# Patient Record
Sex: FEMALE | Race: WHITE | Employment: UNEMPLOYED | URBAN - METROPOLITAN AREA
[De-identification: names, ages, dates, MRNs, and addresses within clinical notes are randomized per-mention and may not be internally consistent; named-entity substitution may affect disease eponyms.]

---

## 2017-04-19 PROBLEM — R42 LIGHTHEADED: Status: ACTIVE | Noted: 2017-01-01

## 2017-04-19 PROBLEM — R42 DIZZINESS: Status: ACTIVE | Noted: 2017-01-01

## 2017-05-17 PROBLEM — M79.7 FIBROMYALGIA: Status: ACTIVE | Noted: 2017-05-17

## 2017-05-17 PROBLEM — F32.A DEPRESSION: Status: ACTIVE | Noted: 2017-05-17

## 2017-05-17 PROBLEM — G45.0 VERTEBROBASILAR TIAS: Status: ACTIVE | Noted: 2017-05-17

## 2017-11-17 PROBLEM — M79.89 LEG SWELLING: Status: ACTIVE | Noted: 2017-11-17

## 2020-11-03 PROBLEM — R42 DIZZINESS: Status: RESOLVED | Noted: 2017-01-01 | Resolved: 2020-11-03

## 2021-03-06 ENCOUNTER — HOSPITAL ENCOUNTER (EMERGENCY)
Age: 41
Discharge: HOME | End: 2021-03-06
Payer: COMMERCIAL

## 2021-03-06 VITALS
WEIGHT: 140 LBS | HEART RATE: 85 BPM | RESPIRATION RATE: 16 BRPM | SYSTOLIC BLOOD PRESSURE: 129 MMHG | HEIGHT: 63 IN | DIASTOLIC BLOOD PRESSURE: 96 MMHG | BODY MASS INDEX: 24.8 KG/M2 | OXYGEN SATURATION: 100 % | TEMPERATURE: 97.1 F

## 2021-03-06 DIAGNOSIS — S05.01XA ABRASION OF RIGHT CORNEA, INITIAL ENCOUNTER: Primary | ICD-10-CM

## 2021-03-06 PROCEDURE — 6370000000 HC RX 637 (ALT 250 FOR IP): Performed by: PHYSICIAN ASSISTANT

## 2021-03-06 PROCEDURE — 6360000002 HC RX W HCPCS: Performed by: PHYSICIAN ASSISTANT

## 2021-03-06 PROCEDURE — 90471 IMMUNIZATION ADMIN: CPT | Performed by: PHYSICIAN ASSISTANT

## 2021-03-06 PROCEDURE — 90715 TDAP VACCINE 7 YRS/> IM: CPT | Performed by: PHYSICIAN ASSISTANT

## 2021-03-06 PROCEDURE — 99284 EMERGENCY DEPT VISIT MOD MDM: CPT

## 2021-03-06 RX ORDER — TETRACAINE HYDROCHLORIDE 5 MG/ML
2 SOLUTION OPHTHALMIC ONCE
Status: COMPLETED | OUTPATIENT
Start: 2021-03-06 | End: 2021-03-06

## 2021-03-06 RX ORDER — KETOROLAC TROMETHAMINE 5 MG/ML
1 SOLUTION OPHTHALMIC 4 TIMES DAILY
Qty: 10 ML | Refills: 1 | Status: SHIPPED | OUTPATIENT
Start: 2021-03-06 | End: 2021-03-13

## 2021-03-06 RX ORDER — ERYTHROMYCIN 5 MG/G
OINTMENT OPHTHALMIC
Qty: 1 TUBE | Refills: 0 | Status: ON HOLD | OUTPATIENT
Start: 2021-03-06 | End: 2021-11-14

## 2021-03-06 RX ADMIN — TETRACAINE HYDROCHLORIDE 2 DROP: 5 SOLUTION/ DROPS OPHTHALMIC at 15:00

## 2021-03-06 RX ADMIN — TETANUS TOXOID, REDUCED DIPHTHERIA TOXOID AND ACELLULAR PERTUSSIS VACCINE, ADSORBED 0.5 ML: 5; 2.5; 8; 8; 2.5 SUSPENSION INTRAMUSCULAR at 15:13

## 2021-03-06 RX ADMIN — FLUORESCEIN SODIUM 1 EACH: 0.6 STRIP OPHTHALMIC at 15:00

## 2021-03-06 ASSESSMENT — PAIN DESCRIPTION - FREQUENCY: FREQUENCY: CONTINUOUS

## 2021-03-06 ASSESSMENT — PAIN DESCRIPTION - DESCRIPTORS: DESCRIPTORS: BURNING

## 2021-03-06 ASSESSMENT — PAIN DESCRIPTION - LOCATION: LOCATION: EYE

## 2021-03-06 ASSESSMENT — PAIN SCALES - GENERAL: PAINLEVEL_OUTOF10: 2
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
## 2021-03-06 NOTE — ED NOTES
Visual acuity  Pt does not wear corrective lenses  Right 20/20  Left 2015  Both 209 Eugenia Burger RN  03/06/21 2306

## 2021-06-22 ENCOUNTER — ROUTINE PRENATAL (OUTPATIENT)
Dept: OBGYN CLINIC | Age: 41
End: 2021-06-22
Payer: COMMERCIAL

## 2021-06-22 ENCOUNTER — ANCILLARY PROCEDURE (OUTPATIENT)
Dept: OBGYN CLINIC | Age: 41
End: 2021-06-22
Payer: COMMERCIAL

## 2021-06-22 VITALS
HEIGHT: 63 IN | WEIGHT: 140 LBS | BODY MASS INDEX: 24.8 KG/M2 | HEART RATE: 89 BPM | DIASTOLIC BLOOD PRESSURE: 67 MMHG | SYSTOLIC BLOOD PRESSURE: 128 MMHG

## 2021-06-22 DIAGNOSIS — D25.9 UTERINE FIBROIDS AFFECTING PREGNANCY, SECOND TRIMESTER: ICD-10-CM

## 2021-06-22 DIAGNOSIS — E03.9 HYPOTHYROID IN PREGNANCY, ANTEPARTUM, SECOND TRIMESTER: ICD-10-CM

## 2021-06-22 DIAGNOSIS — O34.12 UTERINE FIBROIDS AFFECTING PREGNANCY, SECOND TRIMESTER: ICD-10-CM

## 2021-06-22 DIAGNOSIS — O09.522 ELDERLY MULTIGRAVIDA, SECOND TRIMESTER: Primary | ICD-10-CM

## 2021-06-22 DIAGNOSIS — Z3A.14 14 WEEKS GESTATION OF PREGNANCY: ICD-10-CM

## 2021-06-22 DIAGNOSIS — O35.5XX0 SUSPECTED DAMAGE TO FETUS FROM DRUGS, AFFECTING MANAGEMENT OF MOTHER, SINGLE OR UNSPECIFIED FETUS: ICD-10-CM

## 2021-06-22 DIAGNOSIS — Z03.75 SUSPECTED SHORTENING OF CERVIX NOT FOUND: ICD-10-CM

## 2021-06-22 DIAGNOSIS — O99.282 HYPOTHYROID IN PREGNANCY, ANTEPARTUM, SECOND TRIMESTER: ICD-10-CM

## 2021-06-22 DIAGNOSIS — O24.410 DIET CONTROLLED GESTATIONAL DIABETES MELLITUS (GDM) IN SECOND TRIMESTER: ICD-10-CM

## 2021-06-22 DIAGNOSIS — O34.42 HISTORY OF CERVICAL LEEP BIOPSY AFFECTING CARE OF MOTHER, ANTEPARTUM, SECOND TRIMESTER: ICD-10-CM

## 2021-06-22 LAB
GLUCOSE URINE, POC: NEGATIVE
PROTEIN UA: NEGATIVE

## 2021-06-22 PROCEDURE — 76815 OB US LIMITED FETUS(S): CPT | Performed by: OBSTETRICS & GYNECOLOGY

## 2021-06-22 PROCEDURE — 81002 URINALYSIS NONAUTO W/O SCOPE: CPT | Performed by: OBSTETRICS & GYNECOLOGY

## 2021-06-22 PROCEDURE — 99203 OFFICE O/P NEW LOW 30 MIN: CPT | Performed by: OBSTETRICS & GYNECOLOGY

## 2021-06-22 PROCEDURE — G8427 DOCREV CUR MEDS BY ELIG CLIN: HCPCS | Performed by: OBSTETRICS & GYNECOLOGY

## 2021-06-22 PROCEDURE — G8420 CALC BMI NORM PARAMETERS: HCPCS | Performed by: OBSTETRICS & GYNECOLOGY

## 2021-06-22 PROCEDURE — 76817 TRANSVAGINAL US OBSTETRIC: CPT | Performed by: OBSTETRICS & GYNECOLOGY

## 2021-06-22 PROCEDURE — 99243 OFF/OP CNSLTJ NEW/EST LOW 30: CPT | Performed by: OBSTETRICS & GYNECOLOGY

## 2021-06-22 RX ORDER — MULTIVIT-MIN/IRON/FOLIC ACID/K 18-600-40
CAPSULE ORAL
Status: ON HOLD | COMMUNITY
End: 2021-11-14

## 2021-06-22 RX ORDER — LORATADINE 10 MG/1
10 CAPSULE, LIQUID FILLED ORAL DAILY
Status: ON HOLD | COMMUNITY
End: 2021-11-14

## 2021-06-22 RX ORDER — ASPIRIN 81 MG/1
81 TABLET, CHEWABLE ORAL DAILY
Qty: 30 TABLET | Refills: 3 | Status: SHIPPED | OUTPATIENT
Start: 2021-06-22

## 2021-06-22 NOTE — PROGRESS NOTES
21    RE:  Magdy Argueta   : 1980   AGE: 36 y.o. REFERRING PHYSICIAN:              Zbigniew Greene CNM      Thank you for referring Magdy Argueta a 36 y.o. [place] who is seen today in our office. REASON FOR CONSULTATION:  · Consultation and comanagement of pregnant patient with advanced maternal age and gestational diabetes. Mrs Magdy Argueta gave the following history when I saw her today:    OB History    Para Term  AB Living   2 1 1 0 0 1   SAB TAB Ectopic Molar Multiple Live Births   0 0 0 0 0 1      # Outcome Date GA Lbr Eliel/2nd Weight Sex Delivery Anes PTL Lv   2 Current            1 Term 03 41w0d  8 lb 13 oz (3.997 kg) M Vag-Spont EPI N JAXSON     PAST GYNECOLOGICAL  HISTORY:  Positive for:  · HPV, and abnormal pap smears requiring surgical treatment with a  LEEP in . Negative for other sexually transmitted diseases. PAST MEDICAL HISTORY:  Past Medical History:   Diagnosis Date    Abnormal Pap smear of cervix     Chronic back pain     Dazed state 2017    Dizziness 2017    Endometriosis     Fibromyalgia     Headache     Hyperthyroidism 2017    Impaired cognition 2017    Lightheaded 2017    Migraine 2017    1 to 2 per month, 7/10 on the pain scale    Neck pain 2017    Painful sensitiveness to sound 2017    Shoulder pain, bilateral 2017    Negative for Hypertension, Asthma or Heart disease. PAST SURGICAL HISTORY:  Past Surgical History:   Procedure Laterality Date    LAPAROSCOPY      LEEP Bilateral     TONSILLECTOMY Bilateral    Negative for Appendectomy, or Cholecystectomy. ALLERGIES:    Allergies   Allergen Reactions    Biaxin [Clarithromycin]     Sulfa Antibiotics        MEDICATIONS:    · Prenatal Vitamins once per day   · Synthroid 100 mcg orally once per day. · Vitamin B6.  · Unisom. · Vitamin D3 2000 units/day  · Claritin 10 mg orally as needed. · Nasonex as needed.     SOCIAL  HISTORY: Denies smoking, Alcohol and Drug abuse. REVIEW OF SYSTEMS:    CONSTITUTIONAL : No fever, no chills   HEENT :  No headache, no visual changes, no rhinorrhea, no sore throat   CARDIOVASCULAR :  No pain, no palpitations, no edema   RESPIRATORY :  No pain, no shortness of breath   GASTROINTESTINAL : No N/V, no D/C, no abdominal pain   GENITOURINARY :  No dysuria, hematuria and no incontinence   MUSCULOSKELETAL:  No myalgia, No back pain  NEUROLOGICAL :  No numbness, no tingling, no tremors. No history of seizures    FAMILY MEDICAL HISTORY:   Negative for birth defects, chromosomal anomalies and Mental retardation. OB Genetic Screening    Patient's Age 35+ at Date of Delivery Yes     Thalassemia MCV<80 No     Neural Tube Defect No     Congenital Heart Defect No     Down Syndrome No     Isacc-Sachs No     Sickle Cell Disease or Trait No     Hemophilia No     Muscular Dystrophy No     Cystic Fibrosis No     Spink Chorea No     Mental Retardation/Autism No     Was Person Treated for Fragilex? No     Other Inherited Genetic Chromosomal Disorder? No     Maternal Metabolic Disorder No     Patient or [de-identified] Father Had Other Defects? No     Recurrent Pregnancy Loss or Still Birth? No        OB Infection History    Live with Someone with or Exposed to TB? No     Patient or Partner has Hx of Genital Herpes? No     Rash or Viral Illness Since LMP? No     History of STD/GC/Chlamydia/HPV/Syphilis? No      When seen today in our office she had no complaints. PHYSICAL EXAMINATION:    General Appearance:  Healthy looking, alert, no acute distress. Eyes:     No pallor, no icterus, no photophobia. Ears:     No ear drainage. Nose:     No nasal drainage, no paranasal sinus tenderness. Throat:   Mucosa moist, no oral thrush, no exudate. Neck:     No nuchal rigidity. Back:     No CVA tenderness. Abdomen:    Soft nontender. Extremities:    No pretibial pitting edema, no calf muscle tenderness.   Skin:     No rashes, no lesions. BP: 128/67 Weight: 140 lb (63.5 kg) Height: 5' 3\" (160 cm) Pulse: 89     Body mass index is 24.8 kg/m². Urine dipstick:  Glucose : Negative   Albumin:  Negative       An ultrasound evaluation was done in our office today. Please refer to the enclosed copy of the ultrasound report for further information. Prenatal chart and Lab Work Review:    I reviewed with the patient result of the:  · Two hour glucose tolerance test collected 5/12/2021 that was diagnostic of gestational diabetes (fasting 83, 1 hour 138 and 2-hour of 155 mg/dL). Upper limit of normal for the 2-hour is 153 mg/dL  · Normal Hemoglobin A1c of 4.9%, collected on 5/12/2021  · Normal TSH of 2.02 mIU/ml, and normal free T4 of 1.1 ng/dl, collected in your office on 6/21/2021. She did not bring her log book. She said that she has adequate sugar control with fasting sugars under 92 and 2hr pp under 120 mg/dl. IMPRESSION:  1. A 14w0d intrauterine pregnancy. 2. Advanced maternal age. 3. Gestational diabetes. 4. Hypothyroidism. 5. Previous cervical LEEP. 6. Fibroid uterus. RECOMMENDATIONS/PLAN:  I discussed with the patient the following points:    1. The association between advanced maternal age and fetal chromosome abnormalities, based on the [address] [place] [address] Avenue, For a woman who will be 39year old at the time of delivery:  · The risk of trisomy 24 (Down syndrome) is 1:65   · The risk of trisomy 25 (Edward syndrome) is 1:255   · The risk for any chromsomal abnormaility is 1:30  2. Only other genetic amniocentesis can rule out fetal chromosome abnormalities. Normal ultrasound does not. Genetic amniocentesis is associated with an increased risk of fetal loss. ( the risk of loss is quoted to be between 1:200 to 1:500).  The amniocentesis is indicated in her case since we are more likely to find a baby with a chromosome anomalies then cause pregnancy loss if an amniocentesis is done  3. I offered the procedure to the patient and she declined it . She said that the cell free DNA test (NIPT), was drawn in your office yesterday. I explained to her that this is not a diagnostic test, it can miss some chromosomal anomalies. If she changes her mind, she can call our office and schedule the amniocentesis, anytime. She is aware that the  results of the amniocentesis may take 2 weeks or more to complete. The information from the amniocentesis would need to be available by 23 weeks gestation if she plans to act on a abnormal result. Termination of pregnancy is illegal after 24 completed weeks in [place]. 4. Her sugars are well controlled. She is to continue the management of her gestational diabetes with the ADA diet and continue testing her sugar fasting and 2 hours following each meal.  She is to bring her log book to our office next visit. 5. Poor sugar control results in an increased risk of developing  complications such as delayed maturation of the lungs, electrolyte imbalance, seizures, and jaundice. There is also an increased risk of delivering prematurely and an increased risk of having a large for date baby. 6. She is at increased risk of developing pregnancy-induced hypertension because of her advanced age and at the gestational diabetes. I recommend initiation of treatment with baby aspirin 81 mg once per day to delay onset and decrease likelihood of developing PIH. 7. Poorly controlled Hypothyroidism is associated with an increased risk of having a baby with a low IQ. She is currently taking Synthroid for management. Her thyroid function test (Free T4, and a TSH) should be repeated once per trimester. 8. Uterine fibroids during pregnancy are not usually associated with negative outcomes. Sometimes however; they may increase in size. The growth of fibroids may cause discomfort, feelings of pressure, or pain.  Rarely, a large fibroid can obstruct the opening of the uterus necessitating delivery by  section. Usually, fibroids do not need to be treated during pregnancy. Occasionally, medication may be necessary for pain relief. Fibroids generally decrease in size after pregnancy in most cases. Fibroids can increase the risk of:  · Miscarriage   ·  birth   · Malpresentation  · Post partum hemorrhage  9. The cervical length today was reassuring. Only a small number of patients with a previous LEEP end up with cervical incompetence and need a cerclage. 10. She is to continue to follow with you in your office for ongoing obstetric care. 11. I recommend follow-up ultrasound evaluation in the [facility] office in 4 weeks to check on fetal wellbeing anatomy and growth. Thank you again, doctor, for allowing us to be of service to your patient. If I can be of further assistance, please do not hesitate to call. Sincerely,        Jessie Andrew M.D., Jolanta Macdonald    Time spent on the encounter was over 45 minutes including counseling  coordination of care and direct face-to-face contact with the patient. Current encounter billing:  [facility] OFFICE CONSULTATION NEW/ESTAB PATIENT 40 MIN [70947]  US OB 1 or More Fetus Limited [41000 Custom]  US OB Transvaginal [23445 Custom]    **This report has been created using voice recognition software.  It may contain minor errors     which are inherent in voice recognition technology**

## 2021-06-22 NOTE — PATIENT INSTRUCTIONS
thinking about giving birth, the health of your baby, and becoming a parent. You can learn to cope with any anxiety and stress you feel. Follow-up care is a key part of your treatment and safety. Be sure to make and go to all appointments, and call your doctor if you are having problems. It's also a good idea to know your test results and keep a list of the medicines you take. How can you care for yourself at home? Reduce stress    · Ask for help with cooking and housekeeping.     · Figure out who or what causes your stress. Avoid these people or situations as much as possible.     · Relax every day. Taking 10- to 15-minute breaks can make a big difference. Take a walk, listen to music, or take a warm bath.     · Learn relaxation techniques at prenatal or yoga class. Or buy a relaxation tape.     · List your fears about having a baby and becoming a parent. Share the list with someone you trust. Decide which worries are really small, and try to let them go. Exercise    · If you did not exercise much before pregnancy, start slowly. Walking is best. [employer], and do a little more every day.     · Brisk walking, easy jogging, low-impact aerobics, water aerobics, and yoga are good choices. Some sports, such as scuba diving, horseback riding, downhill skiing, gymnastics, and water skiing, are not a good idea.     · Try to do at least 2½ hours a week of moderate exercise, such as a fast walk. One way to do this is to be active 30 minutes a day, at least 5 days a week.     · Wear loose clothing. And wear shoes and a bra that provide good support.     · Warm up and cool down to start and finish your exercise.     · If you want to use weights, be sure to use light weights. They reduce stress on your joints.    Stay at the best weight for you    · Experts recommend that you gain about 1 pound a month during the first 3 months of your pregnancy.     · Experts recommend that you gain about 1 pound a week during your last 6 months of pregnancy, for a total weight gain of 25 to 35 pounds.     · If you are underweight, you will need to gain more weight (about 28 to 40 pounds).     · If you are overweight, you may not need to gain as much weight (about 15 to 25 pounds).     · If you are gaining weight too fast, use common sense. Exercise every day, and limit sweets, fast foods, and fats. Choose lean meats, fruits, and vegetables.     · If you are having twins or more, your doctor may refer you to a dietitian. Where can you learn more? Go to https://chperobeweb.Patients Know Best. org and sign in to your [employer] account. Enter J079 in the [employer] box to learn more about \"Weeks 14 to 18 of Your Pregnancy: Care Instructions. \"     If you do not have an account, please click on the \"Sign Up Now\" link. Current as of: October 8, 2020               Content Version: 12.9  © 2006-2021 [employer]. Care instructions adapted under license by [facility] ([facility]). If you have questions about a medical condition or this instruction, always ask your healthcare professional. [place] [address] any warranty or liability for your use of this information. Patient Education        Learning About When to Call Your Doctor During Pregnancy (Up to 20 Weeks)  Your Care Instructions     It's common to have concerns about what might be a problem during pregnancy. Although most pregnant women don't have any serious problems, it's important to know when to call your doctor if you have certain symptoms. These are general suggestions. Your doctor may give you some more information about when to call. When to call your doctor (up to 20 weeks)  Call 911 anytime you think you may need emergency care. For example, call if:  · You passed out (lost consciousness). Call your doctor now or seek immediate medical care if:  · You have a fever. · You have vaginal bleeding.   · You are dizzy or lightheaded, or you feel like you may faint. · You have symptoms of a urinary tract infection. These may include:  ? Pain or burning when you urinate. ? A frequent need to urinate without being able to pass much urine. ? Pain in the flank, which is just below the rib cage and above the waist on either side of the back. ? Blood in your urine. · You have belly pain. · You think you are having contractions. · You have a sudden release of fluid from your vagina. Watch closely for changes in your health, and be sure to contact your doctor if:  · You have vaginal discharge that smells bad. · You have other concerns about your pregnancy. Follow-up care is a key part of your treatment and safety. Be sure to make and go to all appointments, and call your doctor if you are having problems. It's also a good idea to know your test results and keep a list of the medicines you take. Where can you learn more? Go to https://CareerflopeCodeHS.Basic-Fit. org and sign in to your [employer] account. Enter X331 in the [employer] box to learn more about \"Learning About When to Call Your Doctor During Pregnancy (Up to 20 Weeks). \"     If you do not have an account, please click on the \"Sign Up Now\" link. Current as of: October 8, 2020               Content Version: 12.9  © 2006-2021 Healthwise, [employer]. Care instructions adapted under license by [facility] [place] ([facility]). If you have questions about a medical condition or this instruction, always ask your healthcare professional. [place] [address] any warranty or liability for your use of this information. Patient Education        Diabetes Blood Sugar Emergencies: Your Action Plan  How can you prevent a blood sugar emergency? An important part of living with diabetes is keeping your blood sugar in your target range. You'll need to know what to do if it's too high or too low. Managing your blood sugar levels helps you avoid emergencies.  This care sheet will teach you about the signs of high and low blood sugar. It will help you make an action plan with your doctor for when these signs occur. Low blood sugar is more likely to happen if you take certain medicines for diabetes. It can also happen if you skip a meal, drink alcohol, or exercise more than usual.  You may get high blood sugar if you eat differently than you normally do. One example is eating more carbohydrate than usual. Having a cold, the flu, or other sudden illness can also cause high blood sugar levels. Levels can also rise if you miss a dose of medicine. Any change in how you take your medicine may affect your blood sugar level. So it's important to work with your doctor before you make any changes. Track your blood sugar  Work with your doctor to fill in the blank spaces below that apply to you. Track your levels, know your target range, and write down ways you can get your blood sugar back in your target range. A log book can help you track your levels. Take the book to all of your medical appointments. · Check your blood sugar _____ times a day, at these times:________________________________________________. (For example: Before meals, at bedtime, before exercise, during exercise, other.)  · Your blood sugar target range before a meal is ___________________. Your blood sugar target range after a meal is _______________________. · Do this--___________________________________________________--to get your blood sugar back within your safe range if your blood sugar results are _________________________________________. (For example: Less than 70 or above 250 mg/dL.)  Call your doctor when your blood sugar results are ___________________________________. (For example: Less than 70 or above 250 mg/dL.)  What are the symptoms of low and high blood sugar? Common symptoms of low blood sugar are sweating and feeling shaky, weak, hungry, or confused. Symptoms can start quickly.   Common symptoms of high blood sugar are feeling very thirsty or very hungry. You may also pass urine more often than usual. You may have blurry vision and may lose weight without trying. But some people may have high or low blood sugar without having any symptoms. That's a good reason to check your blood sugar on a regular schedule. What should you do if you have symptoms? Work with your doctor to fill in the blank spaces below that apply to you. Low blood sugar and \"the rule of 15\"  If you have symptoms of low blood sugar, check your blood sugar. If it's below _____ ( for example, below 70), eat or drink a quick-sugar food that has about 15 grams of carbohydrate. Your goal is to get your level back to your safe range. Check your blood sugar again 15 minutes later. If it's still not in your target range, take another 15 grams of carbohydrate and check your blood sugar again in 15 minutes. Repeat this until you reach your target. Then go back to your regular testing schedule. Children usually need less than 15 grams of carbohydrate. Check with your doctor or diabetes educator for the amount that is right for your child. When you have low blood sugar, it's best to stop or reduce any physical activity until your blood sugar is back in your target range and is stable. If you must stay active, eat or drink 30 grams of carbohydrate. Then check your blood sugar again in 15 minutes. If it's not in your target range, take another 30 grams of carbohydrates. Check your blood sugar again in 15 minutes. Keep doing this until you reach your target. You can then go back to your regular testing schedule. If your symptoms or blood sugar levels are getting worse or have not improved after 15 minutes, seek medical care right away. If you take insulin, always carry a glucagon emergency kit. Be sure your family, friends, and coworkers know how to give glucagon.    Here are some examples of quick-sugar foods with 15 grams of carbohydrate:  · 3 or 4 glucose tablets  · 1 tablespoon (3 teaspoons) table sugar  · ½ cup to ¾ cup (4 to 6 ounces) of fruit juice or regular (not diet) soda  · Hard candy (such as 6 Life Savers)  High blood sugar  If you have symptoms of high blood sugar, check your blood sugar. Your goal is to get your level back to your target range. If it's above ______ ( for example, above 250), follow these steps:  · If you missed a dose of your diabetes medicine, take it now. Take only the amount of medicine that you have been prescribed. Do not take more or less medicine. · Give yourself insulin if your doctor has prescribed it for high blood sugar. · Test for ketones, if the doctor told you to do so. If the results of the ketone test show a moderate-to-large amount of ketones, call the doctor for advice. · Wait 30 minutes after you take the extra insulin or the missed medicine. Check your blood sugar again. If your symptoms or blood sugar levels are getting worse or have not improved after taking these steps, seek medical care right away. Follow-up care is a key part of your treatment and safety. Be sure to make and go to all appointments, and call your doctor if you are having problems. It's also a good idea to know your test results and keep a list of the medicines you take. Where can you learn more? Go to https://Vendavopepiceweb.[employer]. org and sign in to your [employer] account. Enter P697 in the [facility] box to learn more about \"Diabetes Blood Sugar Emergencies: Your Action Plan. \"     If you do not have an account, please click on the \"Sign Up Now\" link. Current as of: August 31, 2020               Content Version: 12.9  © 1019-7927 Healthwise, Incorporated. Care instructions adapted under license by [facility] ([facility]). If you have questions about a medical condition or this instruction, always ask your healthcare professional. [address] any warranty or liability for your use of this information.

## 2021-06-22 NOTE — PROGRESS NOTES
Patient here as new OB for prenatal ultrasound. Denies any bleeding, cramping or LOF  Denies any fluterring at present. Patient did not bring sugar logs today.

## 2021-06-22 NOTE — LETTER
21    RE:  Khadra Cruz   : 1980   AGE: 36 y.o. REFERRING PHYSICIAN:              Jeannette Smart CNM      Thank you for referring Khadra Cruz a 36 y.o. Ari Kerr who is seen today in our office. REASON FOR CONSULTATION:  · Consultation and comanagement of pregnant patient with advanced maternal age and gestational diabetes. Mrs Khadra Cruz gave the following history when I saw her today:    OB History    Para Term  AB Living   2 1 1 0 0 1   SAB TAB Ectopic Molar Multiple Live Births   0 0 0 0 0 1      # Outcome Date GA Lbr Eliel/2nd Weight Sex Delivery Anes PTL Lv   2 Current            1 Term 03 41w0d  8 lb 13 oz (3.997 kg) M Vag-Spont EPI N JAXSON     PAST GYNECOLOGICAL  HISTORY:  Positive for:  · HPV, and abnormal pap smears requiring surgical treatment with a  LEEP in . Negative for other sexually transmitted diseases. PAST MEDICAL HISTORY:  Past Medical History:   Diagnosis Date    Abnormal Pap smear of cervix     Chronic back pain     Dazed state 2017    Dizziness 2017    Endometriosis     Fibromyalgia     Headache     Hyperthyroidism 2017    Impaired cognition 2017    Lightheaded 2017    Migraine 2017    1 to 2 per month, 7/10 on the pain scale    Neck pain 2017    Painful sensitiveness to sound 2017    Shoulder pain, bilateral 2017    Negative for Hypertension, Asthma or Heart disease. PAST SURGICAL HISTORY:  Past Surgical History:   Procedure Laterality Date    LAPAROSCOPY      LEEP Bilateral     TONSILLECTOMY Bilateral    Negative for Appendectomy, or Cholecystectomy. ALLERGIES:    Allergies   Allergen Reactions    Biaxin [Clarithromycin]     Sulfa Antibiotics        MEDICATIONS:    · Prenatal Vitamins once per day   · Synthroid 100 mcg orally once per day. · Vitamin B6.  · Unisom. · Vitamin D3 2000 units/day  · Claritin 10 mg orally as needed. · Nasonex as needed.     SOCIAL  HISTORY: Denies smoking, Alcohol and Drug abuse. REVIEW OF SYSTEMS:    CONSTITUTIONAL : No fever, no chills   HEENT :  No headache, no visual changes, no rhinorrhea, no sore throat   CARDIOVASCULAR :  No pain, no palpitations, no edema   RESPIRATORY :  No pain, no shortness of breath   GASTROINTESTINAL : No N/V, no D/C, no abdominal pain   GENITOURINARY :  No dysuria, hematuria and no incontinence   MUSCULOSKELETAL:  No myalgia, No back pain  NEUROLOGICAL :  No numbness, no tingling, no tremors. No history of seizures    FAMILY MEDICAL HISTORY:   Negative for birth defects, chromosomal anomalies and Mental retardation. OB Genetic Screening    Patient's Age 35+ at Date of Delivery Yes     Thalassemia MCV<80 No     Neural Tube Defect No     Congenital Heart Defect No     Down Syndrome No     Isacc-Sachs No     Sickle Cell Disease or Trait No     Hemophilia No     Muscular Dystrophy No     Cystic Fibrosis No     Holt Chorea No     Mental Retardation/Autism No     Was Person Treated for Fragilex? No     Other Inherited Genetic Chromosomal Disorder? No     Maternal Metabolic Disorder No     Patient or [de-identified] Father Had Other Defects? No     Recurrent Pregnancy Loss or Still Birth? No        OB Infection History    Live with Someone with or Exposed to TB? No     Patient or Partner has Hx of Genital Herpes? No     Rash or Viral Illness Since LMP? No     History of STD/GC/Chlamydia/HPV/Syphilis? No      When seen today in our office she had no complaints. PHYSICAL EXAMINATION:    General Appearance:  Healthy looking, alert, no acute distress. Eyes:     No pallor, no icterus, no photophobia. Ears:     No ear drainage. Nose:     No nasal drainage, no paranasal sinus tenderness. Throat:   Mucosa moist, no oral thrush, no exudate. Neck:     No nuchal rigidity. Back:     No CVA tenderness. Abdomen:    Soft nontender. Extremities:    No pretibial pitting edema, no calf muscle tenderness.   Skin:     No rashes, no lesions. BP: 128/67 Weight: 140 lb (63.5 kg) Height: 5' 3\" (160 cm) Pulse: 89     Body mass index is 24.8 kg/m². Urine dipstick:  Glucose : Negative   Albumin:  Negative       An ultrasound evaluation was done in our office today. Please refer to the enclosed copy of the ultrasound report for further information. Prenatal chart and Lab Work Review:    I reviewed with the patient result of the:  · Two hour glucose tolerance test collected 5/12/2021 that was diagnostic of gestational diabetes (fasting 83, 1 hour 138 and 2-hour of 155 mg/dL). Upper limit of normal for the 2-hour is 153 mg/dL  · Normal Hemoglobin A1c of 4.9%, collected on 5/12/2021  · Normal TSH of 2.02 mIU/ml, and normal free T4 of 1.1 ng/dl, collected in your office on 6/21/2021. She did not bring her log book. She said that she has adequate sugar control with fasting sugars under 92 and 2hr pp under 120 mg/dl. IMPRESSION:  1. A 14w0d intrauterine pregnancy. 2. Advanced maternal age. 3. Gestational diabetes. 4. Hypothyroidism. 5. Previous cervical LEEP. 6. Fibroid uterus. RECOMMENDATIONS/PLAN:  I discussed with the patient the following points:    1. The association between advanced maternal age and fetal chromosome abnormalities, based on the [address] [place] [address] Avenue, For a woman who will be 39year old at the time of delivery:  · The risk of trisomy 24 (Down syndrome) is 1:65   · The risk of trisomy 25 (Edward syndrome) is 1:255   · The risk for any chromsomal abnormaility is 1:30  2. Only other genetic amniocentesis can rule out fetal chromosome abnormalities. Normal ultrasound does not. Genetic amniocentesis is associated with an increased risk of fetal loss. ( the risk of loss is quoted to be between 1:200 to 1:500). The amniocentesis is indicated in her case since we are more likely to find a baby with a chromosome anomalies then cause pregnancy loss if an amniocentesis is done  3. I offered the procedure to the patient and she declined it . She said that the cell free DNA test (NIPT), was drawn in your office yesterday. I explained to her that this is not a diagnostic test, it can miss some chromosomal anomalies. If she changes her mind, she can call our office and schedule the amniocentesis, anytime. She is aware that the  results of the amniocentesis may take 2 weeks or more to complete. The information from the amniocentesis would need to be available by 23 weeks gestation if she plans to act on a abnormal result. Termination of pregnancy is illegal after 24 completed weeks in [place]. 4. Her sugars are well controlled. She is to continue the management of her gestational diabetes with the ADA diet and continue testing her sugar fasting and 2 hours following each meal.  She is to bring her log book to our office next visit. 5. Poor sugar control results in an increased risk of developing  complications such as delayed maturation of the lungs, electrolyte imbalance, seizures, and jaundice. There is also an increased risk of delivering prematurely and an increased risk of having a large for date baby. 6. She is at increased risk of developing pregnancy-induced hypertension because of her advanced age and at the gestational diabetes. I recommend initiation of treatment with baby aspirin 81 mg once per day to delay onset and decrease likelihood of developing PIH. 7. Poorly controlled Hypothyroidism is associated with an increased risk of having a baby with a low IQ. She is currently taking Synthroid for management. Her thyroid function test (Free T4, and a TSH) should be repeated once per trimester. 8. Uterine fibroids during pregnancy are not usually associated with negative outcomes. Sometimes however; they may increase in size. The growth of fibroids may cause discomfort, feelings of pressure, or pain.  Rarely, a large fibroid can obstruct the opening of the uterus necessitating delivery by  section. Usually, fibroids do not need to be treated during pregnancy. Occasionally, medication may be necessary for pain relief. Fibroids generally decrease in size after pregnancy in most cases. Fibroids can increase the risk of:  · Miscarriage   ·  birth   · Malpresentation  · Post partum hemorrhage  9. The cervical length today was reassuring. Only a small number of patients with a previous LEEP end up with cervical incompetence and need a cerclage. 10. She is to continue to follow with you in your office for ongoing obstetric care. 11. I recommend follow-up ultrasound evaluation in the [facility] office in 4 weeks to check on fetal wellbeing anatomy and growth. Thank you again, doctor, for allowing us to be of service to your patient. If I can be of further assistance, please do not hesitate to call. Sincerely,        Laura Kiran M.D., [address] [place] [address]    Time spent on the encounter was over 45 minutes including counseling  coordination of care and direct face-to-face contact with the patient. Current encounter billing:  [place] OFFICE CONSULTATION NEW/ESTAB PATIENT 40 MIN [[id number]]  US OB 1 or More Fetus Limited [[id number] Custom]  US OB Transvaginal [[id number] Custom]    **This report has been created using voice recognition software.  It may contain minor errors     which are inherent in voice recognition technology**

## 2021-07-20 ENCOUNTER — ANCILLARY PROCEDURE (OUTPATIENT)
Dept: OBGYN CLINIC | Age: 41
End: 2021-07-20
Payer: COMMERCIAL

## 2021-07-20 ENCOUNTER — ROUTINE PRENATAL (OUTPATIENT)
Dept: OBGYN CLINIC | Age: 41
End: 2021-07-20
Payer: COMMERCIAL

## 2021-07-20 VITALS
HEIGHT: 63 IN | HEART RATE: 92 BPM | BODY MASS INDEX: 25.73 KG/M2 | DIASTOLIC BLOOD PRESSURE: 72 MMHG | WEIGHT: 145.2 LBS | SYSTOLIC BLOOD PRESSURE: 114 MMHG

## 2021-07-20 DIAGNOSIS — O35.5XX0 SUSPECTED DAMAGE TO FETUS FROM DRUGS, AFFECTING MANAGEMENT OF MOTHER, SINGLE OR UNSPECIFIED FETUS: ICD-10-CM

## 2021-07-20 DIAGNOSIS — Z03.75 SUSPECTED SHORTENING OF CERVIX NOT FOUND: ICD-10-CM

## 2021-07-20 DIAGNOSIS — E03.9 HYPOTHYROID IN PREGNANCY, ANTEPARTUM, SECOND TRIMESTER: ICD-10-CM

## 2021-07-20 DIAGNOSIS — O34.12 UTERINE FIBROIDS AFFECTING PREGNANCY, SECOND TRIMESTER: ICD-10-CM

## 2021-07-20 DIAGNOSIS — O24.410 DIET CONTROLLED GESTATIONAL DIABETES MELLITUS (GDM) IN SECOND TRIMESTER: ICD-10-CM

## 2021-07-20 DIAGNOSIS — O09.522 ELDERLY MULTIGRAVIDA, SECOND TRIMESTER: Primary | ICD-10-CM

## 2021-07-20 DIAGNOSIS — Z14.8 CARRIER OF CANAVAN DISEASE: ICD-10-CM

## 2021-07-20 DIAGNOSIS — O35.2XX0 HEREDITARY DISEASE IN FAMILY POSSIBLY AFFECTING FETUS, AFFECTING MANAGEMENT OF MOTHER IN PREGNANCY, SINGLE OR UNSPECIFIED FETUS: ICD-10-CM

## 2021-07-20 DIAGNOSIS — D25.9 UTERINE FIBROIDS AFFECTING PREGNANCY, SECOND TRIMESTER: ICD-10-CM

## 2021-07-20 DIAGNOSIS — Z3A.17 17 WEEKS GESTATION OF PREGNANCY: ICD-10-CM

## 2021-07-20 DIAGNOSIS — O99.282 HYPOTHYROID IN PREGNANCY, ANTEPARTUM, SECOND TRIMESTER: ICD-10-CM

## 2021-07-20 DIAGNOSIS — O34.42 HISTORY OF CERVICAL LEEP BIOPSY AFFECTING CARE OF MOTHER, ANTEPARTUM, SECOND TRIMESTER: ICD-10-CM

## 2021-07-20 DIAGNOSIS — Z36.89 ENCOUNTER FOR FETAL ANATOMIC SURVEY: ICD-10-CM

## 2021-07-20 LAB
GLUCOSE URINE, POC: NEGATIVE
PROTEIN UA: NEGATIVE

## 2021-07-20 PROCEDURE — G8427 DOCREV CUR MEDS BY ELIG CLIN: HCPCS | Performed by: OBSTETRICS & GYNECOLOGY

## 2021-07-20 PROCEDURE — 76817 TRANSVAGINAL US OBSTETRIC: CPT | Performed by: OBSTETRICS & GYNECOLOGY

## 2021-07-20 PROCEDURE — 1036F TOBACCO NON-USER: CPT | Performed by: OBSTETRICS & GYNECOLOGY

## 2021-07-20 PROCEDURE — 99213 OFFICE O/P EST LOW 20 MIN: CPT | Performed by: OBSTETRICS & GYNECOLOGY

## 2021-07-20 PROCEDURE — 81002 URINALYSIS NONAUTO W/O SCOPE: CPT | Performed by: OBSTETRICS & GYNECOLOGY

## 2021-07-20 PROCEDURE — 76811 OB US DETAILED SNGL FETUS: CPT | Performed by: OBSTETRICS & GYNECOLOGY

## 2021-07-20 PROCEDURE — G8419 CALC BMI OUT NRM PARAM NOF/U: HCPCS | Performed by: OBSTETRICS & GYNECOLOGY

## 2021-07-20 NOTE — LETTER
21     RE:  Nakul Greenfield   : 1980   AGE: 36 y.o. REFERRING PROVIDER:              Maddi Butler      Mrs. Nakul Greenfield a 36 y.o.  Jennifer Sandoval  is seen today on follow up in our office. REASON FOR APPOINTMENT:  · Follow-up on a pregnant patient with advanced maternal age and gestational diabetes. MEDICATIONS:    · Prenatal Vitamins once per day   · Synthroid 100 mcg orally once per day. · Vitamin B6.  · Unisom. · Vitamin D3 2000 units/day  · Claritin 10 mg orally as needed. · Nasonex as needed. · Baby aspirin 81 mg once per day. INTERVAL HISTORY:  Mrs Nakul Greenfield had an uneventful course of pregnancy since her last visit to our office. When seen today in our office she had no complaints. PHYSICAL EXAMINATION:  General Appearance:  Healthy looking, alert, no acute distress. Eyes:     No pallor, no icterus, no photophobia. Ears:     No ear drainage. Nose:     No nasal drainage, no paranasal sinus tenderness. Throat:   Mucosa moist, no oral thrush, no exudate. Neck:     No nuchal rigidity. Back:     No CVA tenderness. Abdomen:    Soft nontender. Extremities:    No pretibial pitting edema, no calf muscle tenderness. Skin:     No rashes, no lesions. BP: 114/72 Weight: 145 lb 3.2 oz (65.9 kg) Height: 5' 3\" (160 cm) Pulse: 92     Body mass index is 25.72 kg/m². Urine dipstick:  Glucose : Negative   Albumin:  Negative       An ultrasound evaluation was done in our office today. Please refer to the enclosed copy of the ultrasound report for further information. Chart and Lab Work Review:  I reviewed with the patient the result of the:  · Cell free DNA test collected on 2021 that showed low probability of having baby with trisomy 24, 25 or 13. 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 IMPRESSION:  1. A  18w0d  intrauterine gestation. 2. Advanced maternal age. 3. Gestational diabetes. 4. Hypothyroidism. 5. Previous cervical LEEP. 6. Fibroid uterus. 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 8. Declined the diagnostic genetic amniocentesis (would not consider termination of pregnancy if baby have birth defects mental, motor retardation or chromosome abnormalities        RECOMMENDATIONS/PLAN:  I discussed with the patient the following points:    1. The benefits and limitations of ultrasound in prenatal diagnosis and the fact that some defects might not always be seen by ultrasound. 2. Size of her baby is appropriate for gestational age no structural anomalies are noted. 3. The association between advanced maternal age and fetal chromosome abnormalities, based on the [address] [place] [address] Avenue, For a woman who will be 39year old at the time of delivery:  · The risk of trisomy 24 (Down syndrome) is 1:65   · The risk of trisomy 25 (Edward syndrome) is 1:255   · The risk for any chromsomal abnormaility is 1:30  4. Only other genetic amniocentesis can rule out fetal chromosome abnormalities. Normal ultrasound does not. Genetic amniocentesis is associated with an increased risk of fetal loss. ( the risk of loss is quoted to be between 1:200 to 1:500). The amniocentesis is indicated in her case since we are more likely to find a baby with a chromosome anomalies then cause pregnancy loss if an amniocentesis is done  5. I offered the procedure to the patient and she declined it . She was reassured by the result of the cell free DNA test (NIPT). I explained to her that this is not a diagnostic test, it can miss some chromosomal anomalies.    6. She declined the diagnostic genetic amniocentesis, she and father of the baby said that they would not consider termination of pregnancy if baby have birth Hypothyroidism is associated with an increased risk of having a baby with a low IQ. She is currently taking Synthroid for management. Her thyroid function test (Free T4, and a TSH) should be repeated once per trimester. 12. Uterine fibroids during pregnancy are not usually associated with negative outcomes. Sometimes however; they may increase in size. The growth of fibroids may cause discomfort, feelings of pressure, or pain. Rarely, a large fibroid can obstruct the opening of the uterus necessitating delivery by  section. Usually, fibroids do not need to be treated during pregnancy. Occasionally, medication may be necessary for pain relief. Fibroids generally decrease in size after pregnancy in most cases. Fibroids can increase the risk of:  · Miscarriage   ·  birth   · Malpresentation  · Post partum hemorrhage  13. The cervical length today was reassuring. Only a small number of patients with a previous LEEP end up with cervical incompetence and need a cerclage. 14. She is to continue to follow with you in your office for ongoing obstetric care. 15. I recommend follow-up ultrasound evaluation in the [facility] office in 4 weeks to check on fetal wellbeing anatomy and growth. Thank you again, doctor, for allowing us to be of service to your patient. If I can be of further assistance, please do not hesitate to call. Sincerely,        Rashi Zaman M.D., [address] [facility]    Time spent on the encounter was over 25 minutes including counseling  coordination of care and direct face-to-face contact with the patient. Current encounter billing:  [place] OFFICE OUTPATIENT VISIT LOW MDM 20-30 MINUTES [[id number]]  US OB Detail Fetal Anatomy Single or 1st [[id number] Custom]  US OB Transvaginal [[id number] Custom]    **This report has been created using voice recognition software.  It may contain minor errors     which are inherent in voice recognition technology**

## 2021-07-20 NOTE — PATIENT INSTRUCTIONS
if you are sick, not feeling well or have an infectious process going on please reschedule your appointment by calling [phone]. Also if any family members are not feeling well, please do not bring them to your appointment. We appreciate your cooperation. We are doing this in order to protect our pregnant mothers+ their babies. Call your primary obstetrician with bleeding, leaking of fluid, abdominal tenderness, headache, blurry vision, epigastric pain and increased urinary frequency. Any questions contact Lisa at [phone]. If you are experiencing an emergency and need immediate help, call 911 or go to go emergency room or labor and delivery. Please arrive for your scheduled appointment at least 15 minutes early with your actual insurance card+ a photo ID. Also if you need any refills ordered or have questions, it may take up 48 hours to reply. Please allow ample time for your refills. Call me when you use last refill. Thank you for your cooperation. Patient Education        Weeks 14 to 25 of Your Pregnancy: Care Instructions  Your Care Instructions     During this time, you may start to \"show,\" so that you look pregnant to people around you. You may also notice some changes in your skin, such as itchy spots on your palms or acne on your face. Your baby is now able to pass urine, and your baby's first stool (meconium) is starting to collect in his or her intestines. Hair is also beginning to grow on your baby's head. At your next visit, between weeks 18 and 20, your doctor may do an ultrasound test. The test allows your doctor to check for certain problems. Your doctor can also tell the sex of your baby. This is a good time to think about whether you want to know whether your baby is a boy or a girl. Talk to your doctor about getting a flu shot to help keep you healthy during your pregnancy. As your pregnancy moves along, it is common to worry or feel anxious. Your body is changing a lot.  And you are thinking about giving birth, the health of your baby, and becoming a parent. You can learn to cope with any anxiety and stress you feel. Follow-up care is a key part of your treatment and safety. Be sure to make and go to all appointments, and call your doctor if you are having problems. It's also a good idea to know your test results and keep a list of the medicines you take. How can you care for yourself at home? Reduce stress    · Ask for help with cooking and housekeeping.     · Figure out who or what causes your stress. Avoid these people or situations as much as possible.     · Relax every day. Taking 10- to 15-minute breaks can make a big difference. Take a walk, listen to music, or take a warm bath.     · Learn relaxation techniques at prenatal or yoga class. Or buy a relaxation tape.     · List your fears about having a baby and becoming a parent. Share the list with someone you trust. Decide which worries are really small, and try to let them go. Exercise    · If you did not exercise much before pregnancy, start slowly. Walking is best. [employer], and do a little more every day.     · Brisk walking, easy jogging, low-impact aerobics, water aerobics, and yoga are good choices. Some sports, such as scuba diving, horseback riding, downhill skiing, gymnastics, and water skiing, are not a good idea.     · Try to do at least 2½ hours a week of moderate exercise, such as a fast walk. One way to do this is to be active 30 minutes a day, at least 5 days a week.     · Wear loose clothing. And wear shoes and a bra that provide good support.     · Warm up and cool down to start and finish your exercise.     · If you want to use weights, be sure to use light weights. They reduce stress on your joints.    Stay at the best weight for you    · Experts recommend that you gain about 1 pound a month during the first 3 months of your pregnancy.     · Experts recommend that you gain about 1 pound a week during your like you may faint. · You have symptoms of a urinary tract infection. These may include:  ? Pain or burning when you urinate. ? A frequent need to urinate without being able to pass much urine. ? Pain in the flank, which is just below the rib cage and above the waist on either side of the back. ? Blood in your urine. · You have belly pain. · You think you are having contractions. · You have a sudden release of fluid from your vagina. Watch closely for changes in your health, and be sure to contact your doctor if:  · You have vaginal discharge that smells bad. · You have other concerns about your pregnancy. Follow-up care is a key part of your treatment and safety. Be sure to make and go to all appointments, and call your doctor if you are having problems. It's also a good idea to know your test results and keep a list of the medicines you take. Where can you learn more? Go to https://[employer]. org and sign in to your [employer] account. Enter T717 in the Opax box to learn more about \"Learning About When to Call Your Doctor During Pregnancy (Up to 20 Weeks). \"     If you do not have an account, please click on the \"Sign Up Now\" link. Current as of: October 8, 2020               Content Version: 12.9  © 2006-2021 Healthwise[employer] Incorporated. Care instructions adapted under license by [facility] ([facility]). If you have questions about a medical condition or this instruction, always ask your healthcare professional. [place] [address] any warranty or liability for your use of this information.

## 2021-07-20 NOTE — PROGRESS NOTES
21     RE:  Elvira Araiza   : 1980   AGE: 36 y.o. REFERRING PROVIDER:              Nicky Johnson      Mrs. Elvira Araiza a 36 y.o.    is seen today on follow up in our office. REASON FOR APPOINTMENT:  · Follow-up on a pregnant patient with advanced maternal age and gestational diabetes. MEDICATIONS:    · Prenatal Vitamins once per day   · Synthroid 100 mcg orally once per day. · Vitamin B6.  · Unisom. · Vitamin D3 2000 units/day  · Claritin 10 mg orally as needed. · Nasonex as needed. · Baby aspirin 81 mg once per day. INTERVAL HISTORY:  Mrs Elvira Araiza had an uneventful course of pregnancy since her last visit to our office. When seen today in our office she had no complaints. PHYSICAL EXAMINATION:  General Appearance:  Healthy looking, alert, no acute distress. Eyes:     No pallor, no icterus, no photophobia. Ears:     No ear drainage. Nose:     No nasal drainage, no paranasal sinus tenderness. Throat:   Mucosa moist, no oral thrush, no exudate. Neck:     No nuchal rigidity. Back:     No CVA tenderness. Abdomen:    Soft nontender. Extremities:    No pretibial pitting edema, no calf muscle tenderness. Skin:     No rashes, no lesions. BP: 114/72 Weight: 145 lb 3.2 oz (65.9 kg) Height: 5' 3\" (160 cm) Pulse: 92     Body mass index is 25.72 kg/m². Urine dipstick:  Glucose : Negative   Albumin:  Negative       An ultrasound evaluation was done in our office today. Please refer to the enclosed copy of the ultrasound report for further information. Chart and Lab Work Review:  I reviewed with the patient the result of the:  · Cell free DNA test collected on 2021 that showed low probability of having baby with trisomy 24, 25 or 13. 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 IMPRESSION:  1. A  18w0d  intrauterine gestation. 2. Advanced maternal age. 3. Gestational diabetes. 4. Hypothyroidism. 5. Previous cervical LEEP. 6. Fibroid uterus. 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 8. Declined the diagnostic genetic amniocentesis (would not consider termination of pregnancy if baby have birth defects mental, motor retardation or chromosome abnormalities        RECOMMENDATIONS/PLAN:  I discussed with the patient the following points:    1. The benefits and limitations of ultrasound in prenatal diagnosis and the fact that some defects might not always be seen by ultrasound. 2. Size of her baby is appropriate for gestational age no structural anomalies are noted. 3. The association between advanced maternal age and fetal chromosome abnormalities, based on the [address] [place] [address] Avenue, For a woman who will be 39year old at the time of delivery:  · The risk of trisomy 24 (Down syndrome) is 1:65   · The risk of trisomy 25 (Edward syndrome) is 1:255   · The risk for any chromsomal abnormaility is 1:30  4. Only other genetic amniocentesis can rule out fetal chromosome abnormalities. Normal ultrasound does not. Genetic amniocentesis is associated with an increased risk of fetal loss. ( the risk of loss is quoted to be between 1:200 to 1:500). The amniocentesis is indicated in her case since we are more likely to find a baby with a chromosome anomalies then cause pregnancy loss if an amniocentesis is done  5. I offered the procedure to the patient and she declined it . She was reassured by the result of the cell free DNA test (NIPT). I explained to her that this is not a diagnostic test, it can miss some chromosomal anomalies.    6. She declined the diagnostic genetic amniocentesis, she and father of the baby said that they would not consider termination of pregnancy if baby have birth defects mental, motor retardation or chromosome abnormalities. I told the patient that if she changes her mind, she can call our office and schedule the amniocentesis, anytime. She is aware that the  results of the amniocentesis may take 2 weeks or more to complete. The information from the amniocentesis would need to be available by 23 weeks gestation if she plans to act on a abnormal result. Termination of pregnancy is illegal after 24 completed weeks in [place]. 7. Canavan disease is inherited this as autosomal recessive. If both parents are carriers of the mutation, there is a 25% chance that baby will have the disease. This condition causes developmental delay and intellectual disabilities with hearing and vision loss as well as abnormal muscle tones (both motor and mental retardation). This condition can be detected antenatally through a genetic amniocentesis. The test will be done if father of baby is also a carrier. They declined testing at present time and said that they would not consider termination of pregnancy if baby has the disease. They said that they would wait and tested baby following delivery. I told them that if they change their mind, the father of baby decides to be tested the test can be done in your office. 8. Her sugars are well controlled. She is to continue the management of her gestational diabetes with the ADA diet and continue testing her sugar fasting and 2 hours following each meal.  She is to bring her log book to our office next visit. 9. Poor sugar control results in an increased risk of developing  complications such as delayed maturation of the lungs, electrolyte imbalance, seizures, and jaundice. There is also an increased risk of delivering prematurely and an increased risk of having a large for date baby. 10. She should continue treatment with baby aspirin 81 mg once per day to delay onset and decrease likelihood of developing PIH.   11. Poorly controlled Hypothyroidism is associated with an increased risk of having a baby with a low IQ. She is currently taking Synthroid for management. Her thyroid function test (Free T4, and a TSH) should be repeated once per trimester. 12. Uterine fibroids during pregnancy are not usually associated with negative outcomes. Sometimes however; they may increase in size. The growth of fibroids may cause discomfort, feelings of pressure, or pain. Rarely, a large fibroid can obstruct the opening of the uterus necessitating delivery by  section. Usually, fibroids do not need to be treated during pregnancy. Occasionally, medication may be necessary for pain relief. Fibroids generally decrease in size after pregnancy in most cases. Fibroids can increase the risk of:  · Miscarriage   ·  birth   · Malpresentation  · Post partum hemorrhage  13. The cervical length today was reassuring. Only a small number of patients with a previous LEEP end up with cervical incompetence and need a cerclage. 14. She is to continue to follow with you in your office for ongoing obstetric care. 15. I recommend follow-up ultrasound evaluation in the [facility] office in 4 weeks to check on fetal wellbeing anatomy and growth. Thank you again, doctor, for allowing us to be of service to your patient. If I can be of further assistance, please do not hesitate to call. Sincerely,        Avis Yanes M.D., [address] [facility]    Time spent on the encounter was over 25 minutes including counseling  coordination of care and direct face-to-face contact with the patient. Current encounter billing:  [place] OFFICE OUTPATIENT VISIT LOW MDM 20-30 MINUTES [[id number]]  US OB Detail Fetal Anatomy Single or 1st [[id number] Custom]  US OB Transvaginal [[id number] Custom]    **This report has been created using voice recognition software.  It may contain minor errors     which are inherent in voice recognition technology**

## 2021-07-21 ENCOUNTER — TELEPHONE (OUTPATIENT)
Dept: OBGYN CLINIC | Age: 41
End: 2021-07-21

## 2021-08-17 ENCOUNTER — ROUTINE PRENATAL (OUTPATIENT)
Dept: OBGYN CLINIC | Age: 41
End: 2021-08-17
Payer: COMMERCIAL

## 2021-08-17 ENCOUNTER — ANCILLARY PROCEDURE (OUTPATIENT)
Dept: OBGYN CLINIC | Age: 41
End: 2021-08-17
Payer: COMMERCIAL

## 2021-08-17 VITALS
HEART RATE: 90 BPM | WEIGHT: 152 LBS | DIASTOLIC BLOOD PRESSURE: 73 MMHG | SYSTOLIC BLOOD PRESSURE: 113 MMHG | HEIGHT: 63 IN | BODY MASS INDEX: 26.93 KG/M2

## 2021-08-17 DIAGNOSIS — O34.42 HISTORY OF CERVICAL LEEP BIOPSY AFFECTING CARE OF MOTHER, ANTEPARTUM, SECOND TRIMESTER: ICD-10-CM

## 2021-08-17 DIAGNOSIS — Z3A.22 22 WEEKS GESTATION OF PREGNANCY: ICD-10-CM

## 2021-08-17 DIAGNOSIS — Z14.8 CARRIER OF CANAVAN DISEASE: ICD-10-CM

## 2021-08-17 DIAGNOSIS — O34.12 UTERINE FIBROIDS AFFECTING PREGNANCY, SECOND TRIMESTER: ICD-10-CM

## 2021-08-17 DIAGNOSIS — E03.9 HYPOTHYROID IN PREGNANCY, ANTEPARTUM, SECOND TRIMESTER: ICD-10-CM

## 2021-08-17 DIAGNOSIS — D25.9 UTERINE FIBROIDS AFFECTING PREGNANCY, SECOND TRIMESTER: ICD-10-CM

## 2021-08-17 DIAGNOSIS — O99.282 HYPOTHYROID IN PREGNANCY, ANTEPARTUM, SECOND TRIMESTER: ICD-10-CM

## 2021-08-17 DIAGNOSIS — O35.5XX0 SUSPECTED DAMAGE TO FETUS FROM DRUGS, AFFECTING MANAGEMENT OF MOTHER, SINGLE OR UNSPECIFIED FETUS: ICD-10-CM

## 2021-08-17 DIAGNOSIS — O35.2XX0 HEREDITARY DISEASE IN FAMILY POSSIBLY AFFECTING FETUS, AFFECTING MANAGEMENT OF MOTHER IN PREGNANCY, SINGLE OR UNSPECIFIED FETUS: ICD-10-CM

## 2021-08-17 DIAGNOSIS — O09.522 ELDERLY MULTIGRAVIDA, SECOND TRIMESTER: Primary | ICD-10-CM

## 2021-08-17 DIAGNOSIS — Z03.75 SUSPECTED SHORTENING OF CERVIX NOT FOUND: ICD-10-CM

## 2021-08-17 DIAGNOSIS — O24.410 DIET CONTROLLED GESTATIONAL DIABETES MELLITUS (GDM) IN SECOND TRIMESTER: ICD-10-CM

## 2021-08-17 LAB
GLUCOSE URINE, POC: NEGATIVE
PROTEIN UA: POSITIVE

## 2021-08-17 PROCEDURE — 76817 TRANSVAGINAL US OBSTETRIC: CPT | Performed by: OBSTETRICS & GYNECOLOGY

## 2021-08-17 PROCEDURE — 81002 URINALYSIS NONAUTO W/O SCOPE: CPT | Performed by: OBSTETRICS & GYNECOLOGY

## 2021-08-17 PROCEDURE — G8419 CALC BMI OUT NRM PARAM NOF/U: HCPCS | Performed by: OBSTETRICS & GYNECOLOGY

## 2021-08-17 PROCEDURE — 99212 OFFICE O/P EST SF 10 MIN: CPT | Performed by: OBSTETRICS & GYNECOLOGY

## 2021-08-17 PROCEDURE — 1036F TOBACCO NON-USER: CPT | Performed by: OBSTETRICS & GYNECOLOGY

## 2021-08-17 PROCEDURE — 99213 OFFICE O/P EST LOW 20 MIN: CPT | Performed by: OBSTETRICS & GYNECOLOGY

## 2021-08-17 PROCEDURE — G8427 DOCREV CUR MEDS BY ELIG CLIN: HCPCS | Performed by: OBSTETRICS & GYNECOLOGY

## 2021-08-17 PROCEDURE — 76816 OB US FOLLOW-UP PER FETUS: CPT | Performed by: OBSTETRICS & GYNECOLOGY

## 2021-08-17 NOTE — PATIENT INSTRUCTIONS
if you are sick, not feeling well or have an infectious process going on please reschedule your appointment by calling [phone]. Also if any family members are not feeling well, please do not bring them to your appointment. We appreciate your cooperation. We are doing this in order to protect our pregnant mothers+ their babies. Call your primary obstetrician with bleeding, leaking of fluid, abdominal tenderness, headache, blurry vision, epigastric pain and increased urinary frequency. Any questions contact Lisa at [phone]. If you are experiencing an emergency and need immediate help, call 911 or go to go emergency room or labor and delivery. Please arrive for your scheduled appointment at least 15 minutes early with your actual insurance card+ a photo ID. Also if you need any refills ordered or have questions, it may take up 48 hours to reply. Please allow ample time for your refills. Call me when you use last refill. Thank you for your cooperation. Patient Education        Weeks 22 to 26 of Your Pregnancy: Care Instructions  Overview     As you enter your 7th month of pregnancy at week 26, your baby's lungs are growing stronger and getting ready to breathe. You may notice that your baby responds to the sound of your or your partner's voice. You may also notice that your baby does less turning and twisting and more squirming, kicking, or jerking. Jerking often means that your baby has hiccups. Hiccups are normal and are only temporary. You may want to think about attending a childbirth preparation class. This is also a good time to start thinking about whether you want to have pain medicine during labor. Most pregnant women are tested for gestational diabetes between weeks 25 and 28. Gestational diabetes occurs when your blood sugar level gets too high when you're pregnant. The test is important, because you can have gestational diabetes and not know it.  But the condition can cause problems for your baby.  Follow-up care is a key part of your treatment and safety. Be sure to make and go to all appointments, and call your doctor if you are having problems. It's also a good idea to know your test results and keep a list of the medicines you take. How can you care for yourself at home? Ease discomfort from your baby's kicking  · Change your position. Sometimes this will cause your baby to change position too. · Take a deep breath while you raise your arm over your head. Then breathe out while you drop your arm. Do Kegel exercises to prevent urine from leaking  · You can do Kegel exercises while you stand or sit. ? Squeeze the same muscles you would use to stop your urine. Your belly and thighs should not move. ? Hold the squeeze for 3 seconds, and then relax for 3 seconds. ? Start with 3 seconds. Then add 1 second each week until you are able to squeeze for 10 seconds. ? Repeat the exercise 10 to 15 times for each session. Do three or more sessions each day. Ease or reduce swelling in your feet, ankles, hands, and fingers  · If your fingers are puffy, take off your rings. · Do not eat high-salt foods, such as potato chips. · Prop up your feet on a stool or couch as much as possible. Sleep with pillows under your feet. · Do not stand for long periods of time or wear tight shoes. · Wear support stockings. Where can you learn more? Go to https://[employer] World.aisle411. org and sign in to your [employer] account. Enter G264 in the [facility] box to learn more about \"Weeks 22 to 26 of Your Pregnancy: Care Instructions. \"     If you do not have an account, please click on the \"Sign Up Now\" link. Current as of: October 8, 2020               Content Version: 12.9  © 6919-2772 Healthwise, Incorporated. Care instructions adapted under license by [facility] ([facility]).  If you have questions about a medical condition or this instruction, always ask your healthcare professional. Cody Enrique disclaims any warranty or liability for your use of this information. Patient Education        Learning About When to Call Your Doctor During Pregnancy (After 20 Weeks)  Your Care Instructions  It's common to have concerns about what might be a problem during pregnancy. Although most pregnant women don't have any serious problems, it's important to know when to call your doctor if you have certain symptoms or signs of labor. These are general suggestions. Your doctor may give you some more information about when to call. When to call your doctor (after 20 weeks)  Call 911  anytime you think you may need emergency care. For example, call if:  · You have severe vaginal bleeding. · You have sudden, severe pain in your belly. · You passed out (lost consciousness). · You have a seizure. · You see or feel the umbilical cord. · You think you are about to deliver your baby and can't make it safely to the hospital.  Call your doctor now or seek immediate medical care if:  · You have vaginal bleeding. · You have belly pain. · You have a fever. · You have symptoms of preeclampsia, such as:  ? Sudden swelling of your face, hands, or feet. ? New vision problems (such as dimness, blurring, or seeing spots). ? A severe headache. · You have a sudden release of fluid from your vagina. (You think your water broke.)  · You think that you may be in labor. This means that you've had at least 6 contractions in an hour. · You notice that your baby has stopped moving or is moving much less than normal.  · You have symptoms of a urinary tract infection. These may include:  ? Pain or burning when you urinate. ? A frequent need to urinate without being able to pass much urine. ? Pain in the flank, which is just below the rib cage and above the waist on either side of the back. ? Blood in your urine.   Watch closely for changes in your health, and be sure to contact your doctor if:  · You have vaginal discharge that smells bad. · You have skin changes, such as:  ? A rash. ? Itching. ? Yellow color to your skin. · You have other concerns about your pregnancy. If you have labor signs at 37 weeks or more  If you have signs of labor at 37 weeks or more, your doctor may tell you to call when your labor becomes more active. Symptoms of active labor include:  · Contractions that are regular. · Contractions that are less than 5 minutes apart. · Contractions that are hard to talk through. Follow-up care is a key part of your treatment and safety. Be sure to make and go to all appointments, and call your doctor if you are having problems. It's also a good idea to know your test results and keep a list of the medicines you take. Where can you learn more? Go to https://[employer].MediaScrape. org and sign in to your [employer] account. Enter  in the [employer] box to learn more about \"Learning About When to Call Your Doctor During Pregnancy (After 20 Weeks). \"     If you do not have an account, please click on the \"Sign Up Now\" link. Current as of: October 8, 2020               Content Version: 12.9  © 1586-8628 Healthwise, Incorporated. Care instructions adapted under license by [facility] ([facility]). If you have questions about a medical condition or this instruction, always ask your healthcare professional. [address] any warranty or liability for your use of this information.

## 2021-08-17 NOTE — LETTER
21     RE:  Clinton Iverson   : 1980   AGE: 36 y.o. REFERRING PROVIDER:              Kailey Vega      Mrs. Clinton Iverson a 36 y.o.  Chris Wright  is seen today on follow up in our office. REASON FOR APPOINTMENT:  · Follow-up on a pregnant patient with advanced maternal age, COVID-23 infection and gestational diabetes. MEDICATIONS:    · Omnicef 300 mg orally twice per day for 10 days. · Prenatal Vitamins once per day   · Synthroid 100 mcg orally once per day. · Vitamin B6.  · Unisom. · Vitamin D3 2000 units/day  · Claritin 10 mg orally as needed. · Nasonex as needed. · Baby aspirin 81 mg once per day. INTERVAL HISTORY:  Since her last visit to our office, Mrs Clinton Iverson :  · Had fever and cough and  was diagnosed to have COVID-19 infection  · Was seen by her primary care physician Dr. Erika Ruiz for coughing 2 days ago and was placed on treatment with Omnicef. When seen today in our office she had no complaints. PHYSICAL EXAMINATION:  General Appearance:  Healthy looking, alert, no acute distress. Eyes:     No pallor, no icterus, no photophobia. Ears:     No ear drainage. Nose:     No nasal drainage, no paranasal sinus tenderness. Throat:   Mucosa moist, no oral thrush, no exudate. Neck:     No nuchal rigidity. Back:     No CVA tenderness. Abdomen:    Soft nontender. Extremities:    No pretibial pitting edema, no calf muscle tenderness. Skin:     No rashes, no lesions. BP: 113/73 Weight: 152 lb (68.9 kg) Height: 5' 3\" (160 cm) Pulse: 90     Body mass index is 26.93 kg/m². Urine dipstick:  Glucose : Negative   Albumin:  Trace       An ultrasound evaluation was done in our office today. Please refer to the enclosed copy of the ultrasound report for further information. Chart and Lab Work Review:  Review of her log book shows: Adequate sugar control with fasting sugars under 92 and 2hr pp under 120 mg/dl.     IMPRESSION:  1. A  22w0d intrauterine gestation. 2. Gestational diabetes. 3. Advanced maternal age. 4. COVID-19 infection. 5. Low-lying placenta. 6. Hypothyroidism. 7. Previous cervical LEEP. 8. Fibroid uterus. 5. 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 10. Declined the diagnostic genetic amniocentesis         RECOMMENDATIONS/PLAN:  I discussed with the patient the following points:    1. The benefits and limitations of ultrasound in prenatal diagnosis and the fact that some defects might not always be seen by ultrasound. 2. Size of her baby is appropriate for gestational age no structural anomalies are noted. 1. Only other genetic amniocentesis can rule out fetal chromosome abnormalities. Normal ultrasound does not. 2. Her sugars are well controlled. She is to continue the management of her gestational diabetes with the ADA diet and continue testing her sugar fasting and 2 hours following each meal.  She is to bring her log book to our office next visit. 3. Poor sugar control results in an increased risk of developing  complications such as delayed maturation of the lungs, electrolyte imbalance, seizures, and jaundice. There is also an increased risk of delivering prematurely and an increased risk of having a large for date baby. 4. She should continue treatment with baby aspirin 81 mg once per day to delay onset and decrease likelihood of developing PIH. 5. Poorly controlled Hypothyroidism is associated with an increased risk of having a baby with a low IQ. She is currently taking Synthroid for management. Her thyroid function test (Free T4, and a TSH) should be repeated once per trimester. 6. Uterine fibroids during pregnancy are not usually associated with negative outcomes. Sometimes however; they may increase in size. The growth of fibroids may cause discomfort, feelings of pressure, or pain.  Rarely, a large fibroid can obstruct the opening of the uterus necessitating delivery by  section. Usually, fibroids do not need to be treated during pregnancy. Occasionally, medication may be necessary for pain relief. Fibroids generally decrease in size after pregnancy in most cases. Fibroids can increase the risk of:  · Miscarriage   ·  birth   · Malpresentation  · Post partum hemorrhage  7. The cervical length today was reassuring. Only a small number of patients with a previous LEEP end up with cervical incompetence and need a cerclage. 8. She is to continue to follow with you in your office for ongoing obstetric care. 9. I recommend follow-up ultrasound evaluation in the [facility] office in 4 weeks to check on fetal wellbeing anatomy and growth. Thank you again, doctor, for allowing us to be of service to your patient. If I can be of further assistance, please do not hesitate to call. Sincerely,        Prasanna Goodwin M.D., Marlen Adams    Time spent on the encounter was over 25 minutes including counseling  coordination of care and direct face-to-face contact with the patient. Current encounter billing:  [place] OFFICE OUTPATIENT VISIT LOW [facility] 20-30 MINUTES [[id number]]  US OB Follow Up Transabdominal Approach [[id number] Custom]  US OB Transvaginal [[id number] Custom]    **This report has been created using voice recognition software.  It may contain minor errors     which are inherent in voice recognition technology**

## 2021-08-17 NOTE — PROGRESS NOTES
21     RE:  Diane Mcghee   : 1980   AGE: 36 y.o. REFERRING PROVIDER:              Danica Schaefer      Mrs. Diane Mcghee a 36 y.o.  Atkinson Slice  is seen today on follow up in our office. REASON FOR APPOINTMENT:  · Follow-up on a pregnant patient with advanced maternal age, COVID-23 infection and gestational diabetes. MEDICATIONS:    · Omnicef 300 mg orally twice per day for 10 days. · Prenatal Vitamins once per day   · Synthroid 100 mcg orally once per day. · Vitamin B6.  · Unisom. · Vitamin D3 2000 units/day  · Claritin 10 mg orally as needed. · Nasonex as needed. · Baby aspirin 81 mg once per day. INTERVAL HISTORY:  Since her last visit to our office, Mrs Diane Mcghee :  · Had fever and cough and  was diagnosed to have COVID-19 infection  · Was seen by her primary care physician Dr. Gilbert Gore for coughing 2 days ago and was placed on treatment with Omnicef. When seen today in our office she had no complaints. PHYSICAL EXAMINATION:  General Appearance:  Healthy looking, alert, no acute distress. Eyes:     No pallor, no icterus, no photophobia. Ears:     No ear drainage. Nose:     No nasal drainage, no paranasal sinus tenderness. Throat:   Mucosa moist, no oral thrush, no exudate. Neck:     No nuchal rigidity. Back:     No CVA tenderness. Abdomen:    Soft nontender. Extremities:    No pretibial pitting edema, no calf muscle tenderness. Skin:     No rashes, no lesions. BP: 113/73 Weight: 152 lb (68.9 kg) Height: 5' 3\" (160 cm) Pulse: 90     Body mass index is 26.93 kg/m². Urine dipstick:  Glucose : Negative   Albumin:  Trace       An ultrasound evaluation was done in our office today. Please refer to the enclosed copy of the ultrasound report for further information. Chart and Lab Work Review:  Review of her log book shows: Adequate sugar control with fasting sugars under 92 and 2hr pp under 120 mg/dl.     IMPRESSION:  1. A  22w0d intrauterine gestation. 2. Gestational diabetes. 3. Advanced maternal age. 4. COVID-19 infection. 5. Low-lying placenta. 6. Hypothyroidism. 7. Previous cervical LEEP. 8. Fibroid uterus. 5. 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 10. Declined the diagnostic genetic amniocentesis         RECOMMENDATIONS/PLAN:  I discussed with the patient the following points:    1. The benefits and limitations of ultrasound in prenatal diagnosis and the fact that some defects might not always be seen by ultrasound. 2. Size of her baby is appropriate for gestational age no structural anomalies are noted. 1. Only other genetic amniocentesis can rule out fetal chromosome abnormalities. Normal ultrasound does not. 2. Her sugars are well controlled. She is to continue the management of her gestational diabetes with the ADA diet and continue testing her sugar fasting and 2 hours following each meal.  She is to bring her log book to our office next visit. 3. Poor sugar control results in an increased risk of developing  complications such as delayed maturation of the lungs, electrolyte imbalance, seizures, and jaundice. There is also an increased risk of delivering prematurely and an increased risk of having a large for date baby. 4. She should continue treatment with baby aspirin 81 mg once per day to delay onset and decrease likelihood of developing PIH. 5. Poorly controlled Hypothyroidism is associated with an increased risk of having a baby with a low IQ. She is currently taking Synthroid for management. Her thyroid function test (Free T4, and a TSH) should be repeated once per trimester. 6. Uterine fibroids during pregnancy are not usually associated with negative outcomes. Sometimes however; they may increase in size. The growth of fibroids may cause discomfort, feelings of pressure, or pain.  Rarely, a large fibroid can obstruct the opening of the uterus necessitating delivery by  section. Usually, fibroids do not need to be treated during pregnancy. Occasionally, medication may be necessary for pain relief. Fibroids generally decrease in size after pregnancy in most cases. Fibroids can increase the risk of:  · Miscarriage   ·  birth   · Malpresentation  · Post partum hemorrhage  7. The cervical length today was reassuring. Only a small number of patients with a previous LEEP end up with cervical incompetence and need a cerclage. 8. She is to continue to follow with you in your office for ongoing obstetric care. 9. I recommend follow-up ultrasound evaluation in the [facility] office in 4 weeks to check on fetal wellbeing anatomy and growth. Thank you again, doctor, for allowing us to be of service to your patient. If I can be of further assistance, please do not hesitate to call. Sincerely,        Elton Lackey M.D., [address] [place] [address]    Time spent on the encounter was over 25 minutes including counseling  coordination of care and direct face-to-face contact with the patient. Current encounter billing:  [place] OFFICE OUTPATIENT VISIT LOW [facility] 20-30 MINUTES [[id number]]  US OB Follow Up Transabdominal Approach [[id number] Custom]  US OB Transvaginal [[id number] Custom]    **This report has been created using voice recognition software.  It may contain minor errors     which are inherent in voice recognition technology**

## 2021-09-14 ENCOUNTER — ROUTINE PRENATAL (OUTPATIENT)
Dept: OBGYN CLINIC | Age: 41
End: 2021-09-14
Payer: COMMERCIAL

## 2021-09-14 ENCOUNTER — ANCILLARY PROCEDURE (OUTPATIENT)
Dept: OBGYN CLINIC | Age: 41
End: 2021-09-14
Payer: COMMERCIAL

## 2021-09-14 VITALS
DIASTOLIC BLOOD PRESSURE: 73 MMHG | WEIGHT: 160 LBS | HEART RATE: 92 BPM | BODY MASS INDEX: 28.34 KG/M2 | SYSTOLIC BLOOD PRESSURE: 109 MMHG

## 2021-09-14 DIAGNOSIS — E03.9 HYPOTHYROID IN PREGNANCY, ANTEPARTUM, SECOND TRIMESTER: ICD-10-CM

## 2021-09-14 DIAGNOSIS — Z3A.26 26 WEEKS GESTATION OF PREGNANCY: ICD-10-CM

## 2021-09-14 DIAGNOSIS — O34.42 HISTORY OF CERVICAL LEEP BIOPSY AFFECTING CARE OF MOTHER, ANTEPARTUM, SECOND TRIMESTER: ICD-10-CM

## 2021-09-14 DIAGNOSIS — O35.2XX0 HEREDITARY DISEASE IN FAMILY POSSIBLY AFFECTING FETUS, AFFECTING MANAGEMENT OF MOTHER IN PREGNANCY, SINGLE OR UNSPECIFIED FETUS: ICD-10-CM

## 2021-09-14 DIAGNOSIS — Z14.8 CARRIER OF CANAVAN DISEASE: ICD-10-CM

## 2021-09-14 DIAGNOSIS — O35.5XX0 SUSPECTED DAMAGE TO FETUS FROM DRUGS, AFFECTING MANAGEMENT OF MOTHER, SINGLE OR UNSPECIFIED FETUS: ICD-10-CM

## 2021-09-14 DIAGNOSIS — Z36.89 ENCOUNTER FOR FETAL ANATOMIC SURVEY: ICD-10-CM

## 2021-09-14 DIAGNOSIS — O99.282 HYPOTHYROID IN PREGNANCY, ANTEPARTUM, SECOND TRIMESTER: ICD-10-CM

## 2021-09-14 DIAGNOSIS — O24.410 DIET CONTROLLED GESTATIONAL DIABETES MELLITUS (GDM) IN SECOND TRIMESTER: Primary | ICD-10-CM

## 2021-09-14 DIAGNOSIS — D25.9 UTERINE FIBROIDS AFFECTING PREGNANCY, SECOND TRIMESTER: ICD-10-CM

## 2021-09-14 DIAGNOSIS — O34.12 UTERINE FIBROIDS AFFECTING PREGNANCY, SECOND TRIMESTER: ICD-10-CM

## 2021-09-14 DIAGNOSIS — O09.522 ELDERLY MULTIGRAVIDA, SECOND TRIMESTER: ICD-10-CM

## 2021-09-14 LAB
GLUCOSE URINE, POC: NEGATIVE
PROTEIN UA: POSITIVE

## 2021-09-14 PROCEDURE — G8427 DOCREV CUR MEDS BY ELIG CLIN: HCPCS | Performed by: OBSTETRICS & GYNECOLOGY

## 2021-09-14 PROCEDURE — 99212 OFFICE O/P EST SF 10 MIN: CPT | Performed by: OBSTETRICS & GYNECOLOGY

## 2021-09-14 PROCEDURE — 76817 TRANSVAGINAL US OBSTETRIC: CPT | Performed by: OBSTETRICS & GYNECOLOGY

## 2021-09-14 PROCEDURE — 81002 URINALYSIS NONAUTO W/O SCOPE: CPT | Performed by: OBSTETRICS & GYNECOLOGY

## 2021-09-14 PROCEDURE — 76805 OB US >/= 14 WKS SNGL FETUS: CPT | Performed by: OBSTETRICS & GYNECOLOGY

## 2021-09-14 PROCEDURE — 99213 OFFICE O/P EST LOW 20 MIN: CPT

## 2021-09-14 PROCEDURE — 76819 FETAL BIOPHYS PROFIL W/O NST: CPT | Performed by: OBSTETRICS & GYNECOLOGY

## 2021-09-14 PROCEDURE — 1036F TOBACCO NON-USER: CPT | Performed by: OBSTETRICS & GYNECOLOGY

## 2021-09-14 PROCEDURE — G8419 CALC BMI OUT NRM PARAM NOF/U: HCPCS | Performed by: OBSTETRICS & GYNECOLOGY

## 2021-09-14 NOTE — LETTER
21     RE:  Grace Crawford   : 1980   AGE: 36 y.o. REFERRING PROVIDER:              Crow Lobo      Mrs. Grace Crawford a 36 y.o.  Krupa Jacobs  is seen today on follow up in our office. REASON FOR APPOINTMENT:  · Follow-up on a pregnant patient with advanced maternal age, COVID-23 infection and gestational diabetes. MEDICATIONS:    · Prenatal Vitamins once per day   · Synthroid 100 mcg orally once per day. · Baby aspirin 81 mg once per day. INTERVAL HISTORY:  Mrs Grace Crawford had an uneventful course of pregnancy since her last visit to our office. When seen today in our office she had no complaints. PHYSICAL EXAMINATION:  General Appearance:  Healthy looking, alert, no acute distress. Eyes:     No pallor, no icterus, no photophobia. Ears:     No ear drainage. Nose:     No nasal drainage, no paranasal sinus tenderness. Throat:   Mucosa moist, no oral thrush, no exudate. Neck:     No nuchal rigidity. Back:     No CVA tenderness. Abdomen:    Soft nontender. Extremities:    No pretibial pitting edema, no calf muscle tenderness. Skin:     No rashes, no lesions. BP: 109/73 Weight: 160 lb (72.6 kg)   Pulse: 92     Body mass index is 28.34 kg/m². Urine dipstick:  Glucose : Negative   Albumin:  Trace       An ultrasound evaluation was done in our office today. Please refer to the enclosed copy of the ultrasound report for further information. Chart and Lab Work Review:  Review of her log book shows: Adequate sugar control with fasting sugars under 92 and 2hr pp under 120 mg/dl. IMPRESSION:  1. A  26w0d  intrauterine gestation. 2. Gestational diabetes. 3. Advanced maternal age. 4. COVID-19 infection. 5. Previous Low-lying placenta, resolved. 6. Hypothyroidism. 7. Previous cervical LEEP. 8. Fibroid uterus. 5. 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   10. Declined the diagnostic genetic amniocentesis RECOMMENDATIONS/PLAN:  I discussed with the patient the following points:    1. The benefits and limitations of ultrasound in prenatal diagnosis and the fact that some defects might not always be seen by ultrasound. 2. Size of her baby is appropriate for gestational age no structural anomalies are noted. 1. Only other genetic amniocentesis can rule out fetal chromosome abnormalities. Normal ultrasound does not. 2. Her sugars are well controlled. She is to continue the management of her gestational diabetes with the ADA diet and continue testing her sugar fasting and 2 hours following each meal.  She is to bring her log book to our office next visit. 3. Poor sugar control results in an increased risk of developing  complications such as delayed maturation of the lungs, electrolyte imbalance, seizures, and jaundice. There is also an increased risk of delivering prematurely and an increased risk of having a large for date baby. 4. She should continue treatment with baby aspirin 81 mg once per day to delay onset and decrease likelihood of developing PIH. 5. Poorly controlled Hypothyroidism is associated with an increased risk of having a baby with a low IQ. She is currently taking Synthroid for management. Her thyroid function test (Free T4, and a TSH) should be repeated once per trimester. 6. Uterine fibroids during pregnancy are not usually associated with negative outcomes. Sometimes however; they may increase in size. The growth of fibroids may cause discomfort, feelings of pressure, or pain. Rarely, a large fibroid can obstruct the opening of the uterus necessitating delivery by  section. Usually, fibroids do not need to be treated during pregnancy. Occasionally, medication may be necessary for pain relief. Fibroids generally decrease in size after pregnancy in most cases.   Fibroids can increase the risk of:  · Miscarriage   ·  birth   · Malpresentation  · Post partum hemorrhage  7. On transvaginal ultrasound evaluation today, there is no evidence of placenta previa. The cervical length today was reassuring. Only a small number of patients with a previous LEEP end up with cervical incompetence and need a cerclage. 8. Fetal well-being was confirmed today. The amount of fluid around baby is normal.  The Biophysical profile score of 8/8 is reassuring, and the umbilical artery Doppler studies are normal.  9. She should monitor fetal well-being at home by counting movements after dinner. Her baby should  move 10 times in 2 hours; otherwise, she should call your office immediately. She is also to call, if she develops any headaches, blurred vision, abdominal pain, bleeding, or spotting, which are signs of preeclampsia. 10. She is to continue to follow with you in your office for ongoing obstetric care. 11. I recommend follow-up ultrasound evaluation in the [facility] office in 4 weeks to check on fetal wellbeing anatomy and growth. Thank you again, doctor, for allowing us to be of service to your patient. If I can be of further assistance, please do not hesitate to call. Sincerely,        Rashi Zaman M.D., Dang Saleh    The total time in minutes spent reviewing medical records, reviewing imaging studies, performing ultrasonic imaging, reviewing laboratory testing, and documenting information was over 20  minutes, of which, 50% of the time was spent in patient education, counseling, and coordinating care with the patient, her provider, and/or her family. I answered all of her questions to her satisfaction. Current encounter billing:  [facility] OFFICE OUTPATIENT VISIT 10-19 MINUTES [[id number]]  US OB 14 Plus Weeks Single or First Gestation [[id number] Custom]  US Fetal Biophysical Profile WO Non Stress Testing [[id number] Custom]  US OB Transvaginal [[id number] Custom]    **This report has been created using voice recognition software.  It may contain minor errors     which are inherent in voice recognition technology**

## 2021-09-14 NOTE — PATIENT INSTRUCTIONS
belly button to find the top of your uterus. Check to see if it is tight. ? Contractions can be weak or strong. Record your contractions for an hour. Time a contraction from the start of one contraction to the start of the next one.  ? Single or several strong contractions without a pattern are called Edgard-Burton contractions. They are practice contractions but not the start of labor. They often stop if you change what you are doing. ? Call your doctor if you have regular contractions. Where can you learn more? Go to https://QuantiSenseyasmanieb.VISUALPLANT. org and sign in to your DeepFlex account. Enter A588 in the ShipEarly box to learn more about \"Weeks 26 to 30 of Your Pregnancy: Care Instructions. \"     If you do not have an account, please click on the \"Sign Up Now\" link. Current as of: October 8, 2020               Content Version: 12.9  © 2006-2021 barcoo. Care instructions adapted under license by [facility] ([facility]). If you have questions about a medical condition or this instruction, always ask your healthcare professional. [place] [address] any warranty or liability for your use of this information. Patient Education        Learning About When to Call Your Doctor During Pregnancy (After 20 Weeks)  Your Care Instructions  It's common to have concerns about what might be a problem during pregnancy. Although most pregnant women don't have any serious problems, it's important to know when to call your doctor if you have certain symptoms or signs of labor. These are general suggestions. Your doctor may give you some more information about when to call. When to call your doctor (after 20 weeks)  Call 911  anytime you think you may need emergency care. For example, call if:  · You have severe vaginal bleeding. · You have sudden, severe pain in your belly. · You passed out (lost consciousness). · You have a seizure.   · You see or feel the umbilical cord.  · You think you are about to deliver your baby and can't make it safely to the hospital.  Call your doctor now or seek immediate medical care if:  · You have vaginal bleeding. · You have belly pain. · You have a fever. · You have symptoms of preeclampsia, such as:  ? Sudden swelling of your face, hands, or feet. ? New vision problems (such as dimness, blurring, or seeing spots). ? A severe headache. · You have a sudden release of fluid from your vagina. (You think your water broke.)  · You think that you may be in labor. This means that you've had at least 6 contractions in an hour. · You notice that your baby has stopped moving or is moving much less than normal.  · You have symptoms of a urinary tract infection. These may include:  ? Pain or burning when you urinate. ? A frequent need to urinate without being able to pass much urine. ? Pain in the flank, which is just below the rib cage and above the waist on either side of the back. ? Blood in your urine. Watch closely for changes in your health, and be sure to contact your doctor if:  · You have vaginal discharge that smells bad. · You have skin changes, such as:  ? A rash. ? Itching. ? Yellow color to your skin. · You have other concerns about your pregnancy. If you have labor signs at 37 weeks or more  If you have signs of labor at 37 weeks or more, your doctor may tell you to call when your labor becomes more active. Symptoms of active labor include:  · Contractions that are regular. · Contractions that are less than 5 minutes apart. · Contractions that are hard to talk through. Follow-up care is a key part of your treatment and safety. Be sure to make and go to all appointments, and call your doctor if you are having problems. It's also a good idea to know your test results and keep a list of the medicines you take. Where can you learn more? Go to https://ne.Rehab Loan Group. org and sign in to your PureEnergy Solutions account.  Enter N531 in the [facility] box to learn more about \"Learning About When to Call Your Doctor During Pregnancy (After 20 Weeks). \"     If you do not have an account, please click on the \"Sign Up Now\" link. Current as of: October 8, 2020               Content Version: 12.9  © 3700-2031 Healthwise, Incorporated. Care instructions adapted under license by [facility] ([facility]). If you have questions about a medical condition or this instruction, always ask your healthcare professional. [address] any warranty or liability for your use of this information.

## 2021-10-02 ENCOUNTER — HOSPITAL ENCOUNTER (OUTPATIENT)
Age: 41
Discharge: HOME | End: 2021-10-02
Attending: OBSTETRICS & GYNECOLOGY | Admitting: OBSTETRICS & GYNECOLOGY
Payer: COMMERCIAL

## 2021-10-02 VITALS — SYSTOLIC BLOOD PRESSURE: 125 MMHG | DIASTOLIC BLOOD PRESSURE: 74 MMHG | HEART RATE: 92 BPM

## 2021-10-02 LAB
BACTERIA: ABNORMAL /HPF
BILIRUBIN URINE: NEGATIVE
BLOOD, URINE: ABNORMAL
CLARITY: CLEAR
COLOR: YELLOW
GLUCOSE URINE: NEGATIVE MG/DL
HCT VFR BLD CALC: 36.8 % (ref 34–48)
HEMOGLOBIN: 12.7 G/DL (ref 11.5–15.5)
KETONES, URINE: 15 MG/DL
LEUKOCYTE ESTERASE, URINE: NEGATIVE
MCH RBC QN AUTO: 31.6 PG (ref 26–35)
MCHC RBC AUTO-ENTMCNC: 34.5 % (ref 32–34.5)
MCV RBC AUTO: 91.5 FL (ref 80–99.9)
NITRITE, URINE: NEGATIVE
PDW BLD-RTO: 12.8 FL (ref 11.5–15)
PH UA: 6 (ref 5–9)
PLATELET # BLD: 222 E9/L (ref 130–450)
PMV BLD AUTO: 11 FL (ref 7–12)
PROTEIN UA: NEGATIVE MG/DL
RBC # BLD: 4.02 E12/L (ref 3.5–5.5)
RBC UA: ABNORMAL /HPF (ref 0–2)
SPECIFIC GRAVITY UA: >=1.03 (ref 1–1.03)
UROBILINOGEN, URINE: 0.2 E.U./DL
WBC # BLD: 11.6 E9/L (ref 4.5–11.5)
WBC UA: ABNORMAL /HPF (ref 0–5)

## 2021-10-02 PROCEDURE — 81001 URINALYSIS AUTO W/SCOPE: CPT

## 2021-10-02 PROCEDURE — 2580000003 HC RX 258: Performed by: OBSTETRICS & GYNECOLOGY

## 2021-10-02 PROCEDURE — 36415 COLL VENOUS BLD VENIPUNCTURE: CPT

## 2021-10-02 PROCEDURE — 6370000000 HC RX 637 (ALT 250 FOR IP): Performed by: OBSTETRICS & GYNECOLOGY

## 2021-10-02 PROCEDURE — 99211 OFF/OP EST MAY X REQ PHY/QHP: CPT

## 2021-10-02 PROCEDURE — 85027 COMPLETE CBC AUTOMATED: CPT

## 2021-10-02 PROCEDURE — 99219 PR INITIAL OBSERVATION CARE/DAY 50 MINUTES: CPT | Performed by: OBSTETRICS & GYNECOLOGY

## 2021-10-02 RX ORDER — SODIUM CHLORIDE, SODIUM LACTATE, POTASSIUM CHLORIDE, AND CALCIUM CHLORIDE .6; .31; .03; .02 G/100ML; G/100ML; G/100ML; G/100ML
500 INJECTION, SOLUTION INTRAVENOUS ONCE
Status: COMPLETED | OUTPATIENT
Start: 2021-10-02 | End: 2021-10-03

## 2021-10-02 RX ORDER — ACETAMINOPHEN 325 MG/1
650 TABLET ORAL ONCE
Status: COMPLETED | OUTPATIENT
Start: 2021-10-02 | End: 2021-10-02

## 2021-10-02 RX ORDER — SODIUM CHLORIDE, SODIUM LACTATE, POTASSIUM CHLORIDE, CALCIUM CHLORIDE 600; 310; 30; 20 MG/100ML; MG/100ML; MG/100ML; MG/100ML
INJECTION, SOLUTION INTRAVENOUS CONTINUOUS
Status: DISCONTINUED | OUTPATIENT
Start: 2021-10-02 | End: 2021-10-03 | Stop reason: HOSPADM

## 2021-10-02 RX ADMIN — SODIUM CHLORIDE, POTASSIUM CHLORIDE, SODIUM LACTATE AND CALCIUM CHLORIDE: 600; 310; 30; 20 INJECTION, SOLUTION INTRAVENOUS at 23:16

## 2021-10-02 RX ADMIN — ACETAMINOPHEN 650 MG: 325 TABLET ORAL at 23:15

## 2021-10-02 RX ADMIN — SODIUM CHLORIDE, POTASSIUM CHLORIDE, SODIUM LACTATE AND CALCIUM CHLORIDE 500 ML: 600; 310; 30; 20 INJECTION, SOLUTION INTRAVENOUS at 22:00

## 2021-10-02 ASSESSMENT — PAIN SCALES - GENERAL: PAINLEVEL_OUTOF10: 6

## 2021-10-03 NOTE — PROGRESS NOTES
Patient discharged to home. Verbalizes understanding of discharge instructions, agrees to follow up as scheduled.

## 2021-10-03 NOTE — H&P
U/A:    Lab Results   Component Value Date    COLORU Yellow 03/26/2017    PHUR 6.0 03/26/2017    WBCUA NONE 03/26/2017    WBCUA 5-10 03/29/2011    RBCUA 0-1 03/26/2017    RBCUA NONE 07/12/2012    BACTERIA NONE 03/26/2017    CLARITYU Clear 03/26/2017    SPECGRAV <=1.005 03/26/2017    LEUKOCYTESUR Negative 03/26/2017    UROBILINOGEN 0.2 03/26/2017    BILIRUBINUR Negative 03/26/2017    BILIRUBINUR MODERATE 03/29/2011    BLOODU SMALL 03/26/2017    GLUCOSEU negative 08/17/2021    GLUCOSEU Negative 03/26/2017    GLUCOSEU NEGATIVE 03/29/2011          Assessment:      Threatened premature Labor    Postcoital spotting     Plan:      Monitored for contractions - findings: uterine irritability and reactive strip. Orders: CBC, UA, IV fluids.     Dr. Maria M Lopez notified    Roosevelt Escoto MD,MD,10/2/2021 9:53 PM

## 2021-10-12 ENCOUNTER — ANCILLARY PROCEDURE (OUTPATIENT)
Dept: OBGYN CLINIC | Age: 41
End: 2021-10-12
Payer: COMMERCIAL

## 2021-10-12 ENCOUNTER — ROUTINE PRENATAL (OUTPATIENT)
Dept: OBGYN CLINIC | Age: 41
End: 2021-10-12
Payer: COMMERCIAL

## 2021-10-12 VITALS
HEART RATE: 87 BPM | WEIGHT: 166 LBS | DIASTOLIC BLOOD PRESSURE: 80 MMHG | SYSTOLIC BLOOD PRESSURE: 126 MMHG | HEIGHT: 63 IN | BODY MASS INDEX: 29.41 KG/M2

## 2021-10-12 DIAGNOSIS — Z14.8 CARRIER OF CANAVAN DISEASE: ICD-10-CM

## 2021-10-12 DIAGNOSIS — O99.283 HYPOTHYROID IN PREGNANCY, ANTEPARTUM, THIRD TRIMESTER: ICD-10-CM

## 2021-10-12 DIAGNOSIS — O34.43 HISTORY OF SURGERY OF CERVIX AFFECTING PREGNANCY IN THIRD TRIMESTER, ANTEPARTUM: ICD-10-CM

## 2021-10-12 DIAGNOSIS — O35.5XX0 SUSPECTED DAMAGE TO FETUS FROM DRUGS, AFFECTING MANAGEMENT OF MOTHER, SINGLE OR UNSPECIFIED FETUS: ICD-10-CM

## 2021-10-12 DIAGNOSIS — Z3A.30 30 WEEKS GESTATION OF PREGNANCY: ICD-10-CM

## 2021-10-12 DIAGNOSIS — O24.410 DIET CONTROLLED GESTATIONAL DIABETES MELLITUS (GDM) IN THIRD TRIMESTER: Primary | ICD-10-CM

## 2021-10-12 DIAGNOSIS — O35.2XX0 HEREDITARY DISEASE IN FAMILY POSSIBLY AFFECTING FETUS, AFFECTING MANAGEMENT OF MOTHER IN PREGNANCY, SINGLE OR UNSPECIFIED FETUS: ICD-10-CM

## 2021-10-12 DIAGNOSIS — Z98.890 HISTORY OF SURGERY OF CERVIX AFFECTING PREGNANCY IN THIRD TRIMESTER, ANTEPARTUM: ICD-10-CM

## 2021-10-12 DIAGNOSIS — O09.523 ELDERLY MULTIGRAVIDA, THIRD TRIMESTER: ICD-10-CM

## 2021-10-12 DIAGNOSIS — E03.9 HYPOTHYROID IN PREGNANCY, ANTEPARTUM, THIRD TRIMESTER: ICD-10-CM

## 2021-10-12 LAB
GLUCOSE URINE, POC: NORMAL
PROTEIN UA: NEGATIVE

## 2021-10-12 PROCEDURE — 99213 OFFICE O/P EST LOW 20 MIN: CPT | Performed by: OBSTETRICS & GYNECOLOGY

## 2021-10-12 PROCEDURE — 81002 URINALYSIS NONAUTO W/O SCOPE: CPT | Performed by: OBSTETRICS & GYNECOLOGY

## 2021-10-12 PROCEDURE — G8484 FLU IMMUNIZE NO ADMIN: HCPCS | Performed by: OBSTETRICS & GYNECOLOGY

## 2021-10-12 PROCEDURE — 76816 OB US FOLLOW-UP PER FETUS: CPT | Performed by: OBSTETRICS & GYNECOLOGY

## 2021-10-12 PROCEDURE — 76819 FETAL BIOPHYS PROFIL W/O NST: CPT | Performed by: OBSTETRICS & GYNECOLOGY

## 2021-10-12 PROCEDURE — 99212 OFFICE O/P EST SF 10 MIN: CPT | Performed by: OBSTETRICS & GYNECOLOGY

## 2021-10-12 PROCEDURE — G8419 CALC BMI OUT NRM PARAM NOF/U: HCPCS | Performed by: OBSTETRICS & GYNECOLOGY

## 2021-10-12 PROCEDURE — 1036F TOBACCO NON-USER: CPT | Performed by: OBSTETRICS & GYNECOLOGY

## 2021-10-12 PROCEDURE — G8427 DOCREV CUR MEDS BY ELIG CLIN: HCPCS | Performed by: OBSTETRICS & GYNECOLOGY

## 2021-10-12 NOTE — LETTER
10/12/21     RE:  John Shaw   : 1980   AGE: 36 y.o. REFERRING PROVIDER:              Cherelle Dodge    Mrs. John Shaw a 36 y.o.  Michael Hairston  is seen today on follow up in our office. REASON FOR APPOINTMENT:  · Follow-up on a pregnant patient with advanced maternal age, COVID-23 infection and gestational diabetes. MEDICATIONS:    · Prenatal Vitamins once per day   · Synthroid 100 mcg orally once per day. · Baby aspirin 81 mg once per day. INTERVAL HISTORY:  Since her last visit to our office, Mrs John Shaw :  · Presented to the labor unit of [facility] in [facility] on 10/2/2021 with complaint of cramping and spotting. She was evaluated by the house officer Estelle Carlin, was reassured and was sent home. When seen today in our office she had no complaints. PHYSICAL EXAMINATION:  General Appearance:  Healthy looking, alert, no acute distress. Eyes:     No pallor, no icterus, no photophobia. Ears:     No ear drainage. Nose:     No nasal drainage, no paranasal sinus tenderness. Throat:   Mucosa moist, no oral thrush, no exudate. Neck:     No nuchal rigidity. Back:     No CVA tenderness. Abdomen:    Soft nontender. Extremities:    No pretibial pitting edema, no calf muscle tenderness. Skin:     No rashes, no lesions. BP: 126/80 Weight: 166 lb (75.3 kg) Height: 5' 3\" (160 cm) Pulse: 87     Body mass index is 29.41 kg/m². Urine dipstick:  Glucose : Negative   Albumin:  Negative       An ultrasound evaluation was done in our office today. Please refer to the enclosed copy of the ultrasound report for further information. Chart and Lab Work Review:  Review of her log book shows: Adequate sugar control with fasting sugars under 92 and 2hr pp under 120 mg/dl. IMPRESSION:  1. A  30w0d  intrauterine gestation. 2. Gestational diabetes. 3. Advanced maternal age. 4. COVID-19 infection.   5. Previous Low-lying placenta, necessary for pain relief. Fibroids generally decrease in size after pregnancy in most cases. Fibroids can increase the risk of:  · Miscarriage   ·  birth   · Malpresentation  · Post partum hemorrhage  7. Fetal well-being was confirmed today. The amount of fluid around baby is normal.  The Biophysical profile score of 8/8 is reassuring, and the umbilical artery Doppler studies are normal.  8. She should monitor fetal well-being at home by counting movements after dinner. Her baby should  move 10 times in 2 hours; otherwise, she should call your office immediately. She is also to call, if she develops any headaches, blurred vision, abdominal pain, bleeding, or spotting, which are signs of preeclampsia. 9. She is to continue to follow with you in your office for ongoing obstetric care. 10. I recommend follow-up ultrasound evaluation in the [facility] office in 4 weeks to check on fetal wellbeing anatomy and growth. Thank you again, doctor, for allowing us to be of service to your patient. If I can be of further assistance, please do not hesitate to call. Sincerely,        Lamin Simpson M.D., [address] [facility]    The total time in minutes spent reviewing medical records, reviewing imaging studies, performing ultrasonic imaging, reviewing laboratory testing, and documenting information was over 20  minutes, of which, 50% of the time was spent in patient education, counseling, and coordinating care with the patient, her provider, and/or her family. I answered all of her questions to her satisfaction. Current encounter billing:  CO OFFICE OUTPATIENT VISIT 10-19 MINUTES [[id number]]  US OB 14 Plus Weeks Single or First Gestation [98617 Custom]  US Fetal Biophysical Profile WO Non Stress Testing [85340 Custom]    **This report has been created using voice recognition software.  It may contain minor errors     which are inherent in voice recognition technology**

## 2021-10-12 NOTE — PATIENT INSTRUCTIONS
if you are sick, not feeling well or have an infectious process going on please reschedule your appointment by calling [phone]. Also if any family members are not feeling well, please do not bring them to your appointment. We appreciate your cooperation. We are doing this in order to protect our pregnant mothers+ their babies. Call your primary obstetrician with bleeding, leaking of fluid, abdominal tenderness, headache, blurry vision, epigastric pain and increased urinary frequency. If you are experiencing an emergency and need immediate help, call 911 or go to go emergency room or labor and delivery. Please arrive for your scheduled appointment at least 15 minutes early with your actual insurance card+ a photo ID. Also if you need any refills ordered or have questions, it may take up 48 hours to reply. Please allow ample time for your refills. Call me when you use last refill. Thank you for your cooperation. You might be having an NST at your next appt. Please eat a large snack or breakfast before coming to office. Thank youDo kick counts after dinner. Call your primary obstetrician if less than 10 kicks in 2 hours after dinner. Call your primary obstetrician with bleeding, leaking of fluid, abdominal tenderness, headache, blurry vision, epigastric pain and increased urinary frequency. Any questions contact Kaya Ortega at [phone]. Patient Education        Weeks 30 to 28 of Your Pregnancy: Care Instructions  Overview     You've made it to the final months of your pregnancy! By now your baby is really starting to look like a baby, with hair and plump skin. As you enter the final weeks of pregnancy, the reality of having a baby may start to set in. This is a good time to set up a safe nursery and find quality  if needed. Doing this stuff ahead of time will allow you to focus on caring for and enjoying your new baby. You may also want to take a tour of your hospital's labor and delivery unit.  This will help you get a better idea of what to expect while you're in the hospital.  During these last months, be sure to take good care of yourself. Pay attention to what your body needs. If your doctor says it's okay for you to work, don't push yourself too hard. If you haven't already had the Tdap shot during this pregnancy, talk to your doctor about getting it. It will help protect your  against pertussis infection. Follow-up care is a key part of your treatment and safety. Be sure to make and go to all appointments, and call your doctor if you are having problems. It's also a good idea to know your test results and keep a list of the medicines you take. How can you care for yourself at home? Pay attention to your baby's movements  · You should feel your baby move several times every day. · Your baby now turns less, and kicks and jabs more. · Your baby sleeps 20 to 45 minutes at a time and is more active at certain times of day. · If your doctor wants you to count your baby's kicks:  ? Empty your bladder, and lie on your side or relax in a comfortable chair. ? Write down your start time. ? Pay attention only to your baby's movements. Count any movement except hiccups. ? After you have counted 10 movements, write down your stop time. ? Write down how many minutes it took for your baby to move 10 times. ? If an hour goes by and you have not recorded 10 movements, have something to eat or drink and then count for another hour. If you don't record at least 10 movements in the 2-hour period, call your doctor. Ease heartburn  · Eat small, frequent meals. · Do not eat chocolate, peppermint, or very spicy foods. Avoid drinks with caffeine, such as coffee, tea, and sodas. · Avoid bending over or lying down after meals. · Take a short walk after you eat. · If heartburn is a problem at night, do not eat for 2 hours before bedtime. · Take antacids like Mylanta, Maalox, Rolaids, or Tums.  Do not take antacids that have sodium bicarbonate. Care for varicose veins  · Varicose veins are blood vessels that stretch out with the extra blood during pregnancy. Your legs may ache or throb. Most varicose veins will go away after the birth. · Avoid standing for long periods of time. Sit with your legs crossed at the ankles, not the knees. · Sit with your feet propped up. · Avoid tight clothing or stockings. Wear support hose. · Exercise regularly. Try walking for at least 30 minutes a day. Where can you learn more? Go to https://GustopeKitchIneb.EVERFANS. org and sign in to your Designer Pages Online account. Enter R709 in the Skillset box to learn more about \"Weeks 30 to 32 of Your Pregnancy: Care Instructions. \"     If you do not have an account, please click on the \"Sign Up Now\" link. Current as of: June 16, 2021               Content Version: 13.0  © 8682-5542 NetPayment. Care instructions adapted under license by [facility] ([facility]). If you have questions about a medical condition or this instruction, always ask your healthcare professional. [place] [address] any warranty or liability for your use of this information. Patient Education        Learning About When to Call Your Doctor During Pregnancy (After 20 Weeks)  Overview  It's common to have concerns about what might be a problem when you're pregnant. Most pregnancies don't have any serious problems. But it's still important to know when to call your doctor if you have certain symptoms or signs of labor. These are general suggestions. Your doctor may give you some more information about when to call. When to call your doctor (after 20 weeks)  Call 911  anytime you think you may need emergency care. For example, call if:  · You have severe vaginal bleeding. · You have sudden, severe pain in your belly. · You passed out (lost consciousness). · You have a seizure. · You see or feel the umbilical cord.   · You think you are about to deliver your baby and can't make it safely to the hospital.  Call your doctor now or seek immediate medical care if:  · You have vaginal bleeding. · You have belly pain. · You have a fever. · You have symptoms of preeclampsia, such as:  ? Sudden swelling of your face, hands, or feet. ? New vision problems (such as dimness, blurring, or seeing spots). ? A severe headache. · You have a sudden release of fluid from your vagina. (You think your water broke.)  · You think that you may be in labor. This means that you've had at least 6 contractions in an hour. · You notice that your baby has stopped moving or is moving much less than normal.  · You have symptoms of a urinary tract infection. These may include:  ? Pain or burning when you urinate. ? A frequent need to urinate without being able to pass much urine. ? Pain in the flank, which is just below the rib cage and above the waist on either side of the back. ? Blood in your urine. Watch closely for changes in your health, and be sure to contact your doctor if:  · You have vaginal discharge that smells bad. · You have skin changes, such as:  ? A rash. ? Itching. ? Yellow color to your skin. · You have other concerns about your pregnancy. If you have labor signs at 37 weeks or more  If you have signs of labor at 37 weeks or more, your doctor may tell you to call when your labor becomes more active. Symptoms of active labor include:  · Contractions that are regular. · Contractions that are less than 5 minutes apart. · Contractions that are hard to talk through. Follow-up care is a key part of your treatment and safety. Be sure to make and go to all appointments, and call your doctor if you are having problems. It's also a good idea to know your test results and keep a list of the medicines you take. Where can you learn more? Go to https://ne.GrupHediye. org and sign in to your ApoCell account.  Enter  in the Search Health Information box to learn more about \"Learning About When to Call Your Doctor During Pregnancy (After 20 Weeks). \"     If you do not have an account, please click on the \"Sign Up Now\" link. Current as of: June 16, 2021               Content Version: 13.0  © 9197-3790 Pet Chance Television. Care instructions adapted under license by [facility] ([facility]). If you have questions about a medical condition or this instruction, always ask your healthcare professional. [place] [address] any warranty or liability for your use of this information. Patient Education        Counting Your Baby's Kicks: Care Instructions  Overview     Counting your baby's kicks is one way your doctor can tell that your baby is healthy. Most women--especially in a first pregnancy--feel their baby move for the first time between 16 and 22 weeks. The movement may feel like flutters rather than kicks. Your baby may move more at certain times of the day. When you are active, you may notice less kicking than when you are resting. At your prenatal visits, your doctor will ask whether the baby is active. In your last trimester, your doctor may ask you to count the number of times you feel your baby move. Follow-up care is a key part of your treatment and safety. Be sure to make and go to all appointments, and call your doctor if you are having problems. It's also a good idea to know your test results and keep a list of the medicines you take. How do you count fetal kicks? · A common method of checking your baby's movement is to note the length of time it takes to count ten movements (such as kicks, flutters, or rolls). · Pick your baby's most active time of day to count. This may be any time from morning to evening. · If you don't feel 10 movements in an hour, have something to eat or drink and count for another hour. If you don't feel at least 10 movements in the 2-hour period, call your doctor.   When should you call for help? Call your doctor now or seek immediate medical care if:    · You noticed that your baby has stopped moving or is moving much less than normal.   Watch closely for changes in your health, and be sure to contact your doctor if you have any problems. Where can you learn more? Go to https://chpepiceweb.VeriCorder Technology. org and sign in to your Vitaldent account. Enter N958 in the TwentyPeople box to learn more about \"Counting Your Baby's Kicks: Care Instructions. \"     If you do not have an account, please click on the \"Sign Up Now\" link. Current as of: June 16, 2021               Content Version: 13.0  © 2006-2021 Healthwise, Incorporated. Care instructions adapted under license by [facility] ([facility]). If you have questions about a medical condition or this instruction, always ask your healthcare professional. [address] any warranty or liability for your use of this information.

## 2021-11-09 ENCOUNTER — ROUTINE PRENATAL (OUTPATIENT)
Dept: OBGYN CLINIC | Age: 41
End: 2021-11-09
Payer: COMMERCIAL

## 2021-11-09 ENCOUNTER — ANCILLARY PROCEDURE (OUTPATIENT)
Dept: OBGYN CLINIC | Age: 41
End: 2021-11-09
Payer: COMMERCIAL

## 2021-11-09 VITALS
HEART RATE: 94 BPM | SYSTOLIC BLOOD PRESSURE: 128 MMHG | WEIGHT: 167 LBS | BODY MASS INDEX: 29.59 KG/M2 | HEIGHT: 63 IN | DIASTOLIC BLOOD PRESSURE: 82 MMHG

## 2021-11-09 DIAGNOSIS — O99.283 HYPOTHYROID IN PREGNANCY, ANTEPARTUM, THIRD TRIMESTER: ICD-10-CM

## 2021-11-09 DIAGNOSIS — O35.5XX0 SUSPECTED DAMAGE TO FETUS FROM DRUGS, AFFECTING MANAGEMENT OF MOTHER, SINGLE OR UNSPECIFIED FETUS: ICD-10-CM

## 2021-11-09 DIAGNOSIS — O34.43 HISTORY OF SURGERY OF CERVIX AFFECTING PREGNANCY IN THIRD TRIMESTER, ANTEPARTUM: ICD-10-CM

## 2021-11-09 DIAGNOSIS — Z14.8 CARRIER OF CANAVAN DISEASE: ICD-10-CM

## 2021-11-09 DIAGNOSIS — O09.523 ELDERLY MULTIGRAVIDA, THIRD TRIMESTER: ICD-10-CM

## 2021-11-09 DIAGNOSIS — O24.410 DIET CONTROLLED GESTATIONAL DIABETES MELLITUS (GDM) IN THIRD TRIMESTER: Primary | ICD-10-CM

## 2021-11-09 DIAGNOSIS — O35.2XX0 HEREDITARY DISEASE IN FAMILY POSSIBLY AFFECTING FETUS, AFFECTING MANAGEMENT OF MOTHER IN PREGNANCY, SINGLE OR UNSPECIFIED FETUS: ICD-10-CM

## 2021-11-09 DIAGNOSIS — Z3A.34 34 WEEKS GESTATION OF PREGNANCY: ICD-10-CM

## 2021-11-09 DIAGNOSIS — E03.9 HYPOTHYROID IN PREGNANCY, ANTEPARTUM, THIRD TRIMESTER: ICD-10-CM

## 2021-11-09 DIAGNOSIS — Z98.890 HISTORY OF SURGERY OF CERVIX AFFECTING PREGNANCY IN THIRD TRIMESTER, ANTEPARTUM: ICD-10-CM

## 2021-11-09 LAB
GLUCOSE URINE, POC: NEGATIVE
PROTEIN UA: POSITIVE

## 2021-11-09 PROCEDURE — 81002 URINALYSIS NONAUTO W/O SCOPE: CPT | Performed by: OBSTETRICS & GYNECOLOGY

## 2021-11-09 PROCEDURE — G8419 CALC BMI OUT NRM PARAM NOF/U: HCPCS | Performed by: OBSTETRICS & GYNECOLOGY

## 2021-11-09 PROCEDURE — 76818 FETAL BIOPHYS PROFILE W/NST: CPT | Performed by: OBSTETRICS & GYNECOLOGY

## 2021-11-09 PROCEDURE — 99213 OFFICE O/P EST LOW 20 MIN: CPT | Performed by: OBSTETRICS & GYNECOLOGY

## 2021-11-09 PROCEDURE — 1036F TOBACCO NON-USER: CPT | Performed by: OBSTETRICS & GYNECOLOGY

## 2021-11-09 PROCEDURE — G8427 DOCREV CUR MEDS BY ELIG CLIN: HCPCS | Performed by: OBSTETRICS & GYNECOLOGY

## 2021-11-09 PROCEDURE — 99212 OFFICE O/P EST SF 10 MIN: CPT | Performed by: OBSTETRICS & GYNECOLOGY

## 2021-11-09 PROCEDURE — G8484 FLU IMMUNIZE NO ADMIN: HCPCS | Performed by: OBSTETRICS & GYNECOLOGY

## 2021-11-09 PROCEDURE — 76816 OB US FOLLOW-UP PER FETUS: CPT | Performed by: OBSTETRICS & GYNECOLOGY

## 2021-11-09 NOTE — PATIENT INSTRUCTIONS
if you are sick, not feeling well or have an infectious process going on please reschedule your appointment by calling [phone]. Also if any family members are not feeling well, please do not bring them to your appointment. We appreciate your cooperation. We are doing this in order to protect our pregnant mothers+ their babies. Call your primary obstetrician with bleeding, leaking of fluid, abdominal tenderness, headache, blurry vision, epigastric pain and increased urinary frequency. If you are experiencing an emergency and need immediate help, call 911 or go to go emergency room or labor and delivery. Please arrive for your scheduled appointment at least 15 minutes early with your actual insurance card+ a photo ID. Also if you need any refills ordered or have questions, it may take up 48 hours to reply. Please allow ample time for your refills. Call me when you use last refill. Thank you for your cooperation. You might be having an NST at your next appt. Please eat a large snack or breakfast before coming to office. Thank youDo kick counts after dinner. Call your primary obstetrician if less than 10 kicks in 2 hours after dinner. Call your primary obstetrician with bleeding, leaking of fluid, abdominal tenderness, headache, blurry vision, epigastric pain and increased urinary frequency. Any questions contact Andrew Garrido at [phone]. Patient Education        Weeks 34 to 39 of Your Pregnancy: Care Instructions  Overview     By now, your baby and your belly have grown quite large. It's almost time to give birth! Your baby's lungs are almost ready to breathe air. The skull bones are firm enough to protect your baby's head, but soft enough to move down through the birth canal.  You may be feeling excited and happy at times--but also anxious or scared. You might wonder how you'll know if you're in labor or what to expect during labor. Try to be open and flexible in your expectations of the birth.  Because each birth is different, there's no way to know exactly what childbirth will be like for you. Talk to your doctor or midwife about any concerns you have. If you haven't already had the Tdap shot during this pregnancy, talk to your doctor about getting it. It will help protect your  against pertussis infection. In the 36th week, you'll probably have a test for group B streptococcus (GBS). GBS is a common type of bacteria that can live in the vagina and rectum. It can make your baby sick after birth. If you test positive, you will get antibiotics during labor. The medicine will help keep your baby from getting the bacteria. Follow-up care is a key part of your treatment and safety. Be sure to make and go to all appointments, and call your doctor if you are having problems. It's also a good idea to know your test results and keep a list of the medicines you take. How can you care for yourself at home? Learn about pain relief choices  · Pain is different for everyone. Talk with your doctor about your feelings about pain. · You can choose from several types of pain relief. These include medicine, breathing techniques, and comfort measures. You can use more than one option. · If you choose to have pain medicine during labor, talk to your doctor about your options. Some medicines lower anxiety and help with some of the pain. Others make your lower body numb so that you won't feel pain. · Be sure to tell your doctor about your pain medicine choice before you start labor or very early in your labor. You may be able to change your mind as labor progresses. Labor and delivery  · The first stage of labor has three parts: early, active, and transition. ? It's common to have early labor at home. You can stay busy or rest, eat light snacks, drink clear fluids, and start counting contractions. ? When talking during a contraction gets hard, you may be moving to active labor.  During active labor, you should head for the hospital if you aren't there already. ? You are in active labor when contractions come every 3 to 4 minutes and last about 60 seconds. Your cervix is opening more rapidly. ? If your water breaks, contractions will come faster and stronger. ? During transition, your cervix is stretching, and contractions are coming more rapidly. ? You may want to push, but your cervix might not be ready. Your doctor will tell you when to push. · The second stage starts when your cervix is completely opened and you are ready to push. ? Contractions are very strong to push the baby down the birth canal.  ? You will probably feel the urge to push. You may feel like you need to have a bowel movement. ? You may be coached to push with contractions. These contractions will be very strong, but you won't have them as often. You can get a little rest between contractions. ? One last push, and your baby is born. · The third stage is when a few more contractions push out the placenta. This may take 30 minutes or less. Where can you learn more? Go to https://Wirecom Technologies.Citilog. org and sign in to your Equidam account. Enter B005 in the [employer] box to learn more about \"Weeks 34 to 36 of Your Pregnancy: Care Instructions. \"     If you do not have an account, please click on the \"Sign Up Now\" link. Current as of: June 16, 2021               Content Version: 13.0  © 2802-2678 Healthwise, Incorporated. Care instructions adapted under license by [facility] ([facility]). If you have questions about a medical condition or this instruction, always ask your healthcare professional. [place] [address] any warranty or liability for your use of this information. Patient Education        Learning About When to Call Your Doctor During Pregnancy (After 20 Weeks)  Overview  It's common to have concerns about what might be a problem when you're pregnant. Most pregnancies don't have any serious problems.  But it's still important to know when to call your doctor if you have certain symptoms or signs of labor. These are general suggestions. Your doctor may give you some more information about when to call. When to call your doctor (after 20 weeks)  Call 911  anytime you think you may need emergency care. For example, call if:  · You have severe vaginal bleeding. · You have sudden, severe pain in your belly. · You passed out (lost consciousness). · You have a seizure. · You see or feel the umbilical cord. · You think you are about to deliver your baby and can't make it safely to the hospital.  Call your doctor now or seek immediate medical care if:  · You have vaginal bleeding. · You have belly pain. · You have a fever. · You have symptoms of preeclampsia, such as:  ? Sudden swelling of your face, hands, or feet. ? New vision problems (such as dimness, blurring, or seeing spots). ? A severe headache. · You have a sudden release of fluid from your vagina. (You think your water broke.)  · You think that you may be in labor. This means that you've had at least 6 contractions in an hour. · You notice that your baby has stopped moving or is moving much less than normal.  · You have symptoms of a urinary tract infection. These may include:  ? Pain or burning when you urinate. ? A frequent need to urinate without being able to pass much urine. ? Pain in the flank, which is just below the rib cage and above the waist on either side of the back. ? Blood in your urine. Watch closely for changes in your health, and be sure to contact your doctor if:  · You have vaginal discharge that smells bad. · You have skin changes, such as:  ? A rash. ? Itching. ? Yellow color to your skin. · You have other concerns about your pregnancy. If you have labor signs at 37 weeks or more  If you have signs of labor at 37 weeks or more, your doctor may tell you to call when your labor becomes more active.  Symptoms of active labor include:  · Contractions that are regular. · Contractions that are less than 5 minutes apart. · Contractions that are hard to talk through. Follow-up care is a key part of your treatment and safety. Be sure to make and go to all appointments, and call your doctor if you are having problems. It's also a good idea to know your test results and keep a list of the medicines you take. Where can you learn more? Go to https://chperobewchristie.Total Eclipse. org and sign in to your [employer] account. Enter  in the [employer] box to learn more about \"Learning About When to Call Your Doctor During Pregnancy (After 20 Weeks). \"     If you do not have an account, please click on the \"Sign Up Now\" link. Current as of: June 16, 2021               Content Version: 13.0  © 2359-7600 YouFetch. Care instructions adapted under license by [facility] ([facility]). If you have questions about a medical condition or this instruction, always ask your healthcare professional. [place] [address] any warranty or liability for your use of this information. Patient Education        Counting Your Baby's Kicks: Care Instructions  Overview     Counting your baby's kicks is one way your doctor can tell that your baby is healthy. Most women--especially in a first pregnancy--feel their baby move for the first time between 16 and 22 weeks. The movement may feel like flutters rather than kicks. Your baby may move more at certain times of the day. When you are active, you may notice less kicking than when you are resting. At your prenatal visits, your doctor will ask whether the baby is active. In your last trimester, your doctor may ask you to count the number of times you feel your baby move. Follow-up care is a key part of your treatment and safety. Be sure to make and go to all appointments, and call your doctor if you are having problems.  It's also a good idea to know your test results and keep a list of the medicines you take. How do you count fetal kicks? · A common method of checking your baby's movement is to note the length of time it takes to count ten movements (such as kicks, flutters, or rolls). · Pick your baby's most active time of day to count. This may be any time from morning to evening. · If you don't feel 10 movements in an hour, have something to eat or drink and count for another hour. If you don't feel at least 10 movements in the 2-hour period, call your doctor. When should you call for help? Call your doctor now or seek immediate medical care if:    · You noticed that your baby has stopped moving or is moving much less than normal.   Watch closely for changes in your health, and be sure to contact your doctor if you have any problems. Where can you learn more? Go to https://WriteReader ApSpepiceweb.MECLUB. org and sign in to your Sunbeam account. Enter N885 in the [employer] box to learn more about \"Counting Your Baby's Kicks: Care Instructions. \"     If you do not have an account, please click on the \"Sign Up Now\" link. Current as of: June 16, 2021               Content Version: 13.0  © 2006-2021 Healthwise, Incorporated. Care instructions adapted under license by [facility] ([facility]). If you have questions about a medical condition or this instruction, always ask your healthcare professional. [place] [address] any warranty or liability for your use of this information.

## 2021-11-09 NOTE — PROGRESS NOTES
Nsr started at [phone] and ended at 1. fht 140 with moderate variability and accelerations. No decelerations or contractions monitored. Reactive by Dr. Cade Sultana. [facility].

## 2021-11-09 NOTE — PROGRESS NOTES
21     RE:  Anna Marie Sawyer   : 1980   AGE: 36 y.o. REFERRING PROVIDERs:              Caro Matos MD    Mrs. Anna Marie Sawyer a 36 y.o.  Veryl Arms  is seen today on follow up in our office. REASON FOR APPOINTMENT:  · Follow-up on a pregnant patient with advanced maternal age, COVID-23 infection and gestational diabetes. MEDICATIONS:    · Prenatal Vitamins once per day   · Synthroid 100 mcg orally once per day. · Baby aspirin 81 mg once per day. · Tums as needed for heartburn. INTERVAL HISTORY:  Mrs Anna Marie Sawyer had an uneventful course of pregnancy since her last visit to our office. When seen today in our office she had no complaints. PHYSICAL EXAMINATION:  General Appearance:  Healthy looking, alert, no acute distress. Eyes:     No pallor, no icterus, no photophobia. Ears:     No ear drainage. Nose:     No nasal drainage, no paranasal sinus tenderness. Throat:   Mucosa moist, no oral thrush, no exudate. Neck:     No nuchal rigidity. Back:     No CVA tenderness. Abdomen:    Soft nontender. Extremities:    No pretibial pitting edema, no calf muscle tenderness. Skin:     No rashes, no lesions. BP: 128/82 Weight: 167 lb (75.8 kg) Height: 5' 3\" (160 cm) Pulse: 94     Body mass index is 29.58 kg/m². Urine dipstick:  Glucose : Negative   Albumin:  Trace       An ultrasound evaluation was done in our office today. Please refer to the enclosed copy of the ultrasound report for further information. Chart and Lab Work Review:  Review of her log book shows: Adequate sugar control with fasting sugars under 92 and 2hr pp under 120 mg/dl. IMPRESSION:  1. A  34w0d  intrauterine gestation. 2. Gestational diabetes. 3. Advanced maternal age. 4. COVID-19 infection. 5. Previous Low-lying placenta, resolved. 6. Hypothyroidism. 7. Previous cervical LEEP. 8. Fibroid uterus.   [facility] 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 10. Declined the diagnostic genetic amniocentesis     RECOMMENDATIONS/PLAN:  I discussed with the patient the following points:    1. The benefits and limitations of ultrasound in prenatal diagnosis and the fact that some defects might not always be seen by ultrasound. 2. Size of her baby is appropriate for gestational age no structural anomalies are noted. 1. Only other genetic amniocentesis can rule out fetal chromosome abnormalities. Normal ultrasound does not. 2. Her sugars are well controlled. She is to continue the management of her gestational diabetes with the ADA diet and continue testing her sugar fasting and 2 hours following each meal.  She is to bring her log book to our office next visit. 3. Poor sugar control results in an increased risk of developing  complications such as delayed maturation of the lungs, electrolyte imbalance, seizures, and jaundice. There is also an increased risk of delivering prematurely and an increased risk of having a large for date baby. 4. She should continue treatment with baby aspirin 81 mg once per day to delay onset and decrease likelihood of developing PIH. 5. Poorly controlled Hypothyroidism is associated with an increased risk of having a baby with a low IQ. She is currently taking Synthroid for management. Her thyroid function test (Free T4, and a TSH) should be repeated once per trimester. 6. Uterine fibroids during pregnancy are not usually associated with negative outcomes. Sometimes however; they may increase in size. The growth of fibroids may cause discomfort, feelings of pressure, or pain. Rarely, a large fibroid can obstruct the opening of the uterus necessitating delivery by  section. Usually, fibroids do not need to be treated during pregnancy. Occasionally, medication may be necessary for pain relief. Fibroids generally decrease in size after pregnancy in most cases.   Fibroids can increase the risk of Miscarriage,  birth, Malpresentation, and Post partum hemorrhage  7. Fetal well-being was confirmed today. The amount of fluid around baby is normal.  The Biophysical profile score of 10/10 is reassuring, and the umbilical artery Doppler studies are normal.  8. She should monitor fetal well-being at home by counting movements after dinner. Her baby should  move 10 times in 2 hours; otherwise, she should call your office immediately. She is also to call, if she develops any headaches, blurred vision, abdominal pain, bleeding, or spotting, which are signs of preeclampsia. 9. She is to continue to follow with you in your office for ongoing obstetric care. 10. To be followed up in your office with nonstress test every Friday for remainder of pregnancy. 11. To be followed up in our [facility] office every Tuesday with BPP Dopplers for remainder of pregnancy. Thank you again, doctor, for allowing us to be of service to your patient. If I can be of further assistance, please do not hesitate to call. Sincerely,        Renae Coffey M.D., [place] Ripa    The total time in minutes spent reviewing medical records, reviewing imaging studies, performing ultrasonic imaging, reviewing laboratory testing, and documenting information was over 20  minutes, of which, 50% of the time was spent in patient education, counseling, and coordinating care with the patient, her provider, and/or her family. I answered all of her questions to her satisfaction. Current encounter billing:  [place] OFFICE OUTPATIENT VISIT 10-19 MINUTES [[id number]]  US OB Follow Up Transabdominal Approach [XNM352 Custom]  Biophysical profile [OBO12 Custom]    **This report has been created using voice recognition software. It may contain minor errors     which are inherent in voice recognition technology**                NON STRESS TEST INTERPRETATION    21    RE:  Moses Gaviria   : 1980   AGE: 36 y.o.     GESTATIONAL AGE:  34w0d    DIAGNOSIS:   Gestational

## 2021-11-09 NOTE — LETTER
tested. 10. Declined the diagnostic genetic amniocentesis     RECOMMENDATIONS/PLAN:  I discussed with the patient the following points:    1. The benefits and limitations of ultrasound in prenatal diagnosis and the fact that some defects might not always be seen by ultrasound. 2. Size of her baby is appropriate for gestational age no structural anomalies are noted. 1. Only other genetic amniocentesis can rule out fetal chromosome abnormalities. Normal ultrasound does not. 2. Her sugars are well controlled. She is to continue the management of her gestational diabetes with the ADA diet and continue testing her sugar fasting and 2 hours following each meal.  She is to bring her log book to our office next visit. 3. Poor sugar control results in an increased risk of developing  complications such as delayed maturation of the lungs, electrolyte imbalance, seizures, and jaundice. There is also an increased risk of delivering prematurely and an increased risk of having a large for date baby. 4. She should continue treatment with baby aspirin 81 mg once per day to delay onset and decrease likelihood of developing PIH. 5. Poorly controlled Hypothyroidism is associated with an increased risk of having a baby with a low IQ. She is currently taking Synthroid for management. Her thyroid function test (Free T4, and a TSH) should be repeated once per trimester. 6. Uterine fibroids during pregnancy are not usually associated with negative outcomes. Sometimes however; they may increase in size. The growth of fibroids may cause discomfort, feelings of pressure, or pain. Rarely, a large fibroid can obstruct the opening of the uterus necessitating delivery by  section. Usually, fibroids do not need to be treated during pregnancy. Occasionally, medication may be necessary for pain relief. Fibroids generally decrease in size after pregnancy in most cases.   Fibroids can increase the risk of Miscarriage,  birth, Malpresentation, and Post partum hemorrhage  7. Fetal well-being was confirmed today. The amount of fluid around baby is normal.  The Biophysical profile score of 10/10 is reassuring, and the umbilical artery Doppler studies are normal.  8. She should monitor fetal well-being at home by counting movements after dinner. Her baby should  move 10 times in 2 hours; otherwise, she should call your office immediately. She is also to call, if she develops any headaches, blurred vision, abdominal pain, bleeding, or spotting, which are signs of preeclampsia. 9. She is to continue to follow with you in your office for ongoing obstetric care. 10. To be followed up in your office with nonstress test every Friday for remainder of pregnancy. 11. To be followed up in our [facility] office every Tuesday with BPP Dopplers for remainder of pregnancy. Thank you again, doctor, for allowing us to be of service to your patient. If I can be of further assistance, please do not hesitate to call. Sincerely,        Lamin Simpson M.D., Itzel Ricardo    The total time in minutes spent reviewing medical records, reviewing imaging studies, performing ultrasonic imaging, reviewing laboratory testing, and documenting information was over 20  minutes, of which, 50% of the time was spent in patient education, counseling, and coordinating care with the patient, her provider, and/or her family. I answered all of her questions to her satisfaction. Current encounter billing:  [place] OFFICE OUTPATIENT VISIT 10-19 MINUTES [[id number]]  US OB Follow Up Transabdominal Approach [KAR509 Custom]  Biophysical profile [OBO12 Custom]    **This report has been created using voice recognition software. It may contain minor errors     which are inherent in voice recognition technology**                NON STRESS TEST INTERPRETATION    21    RE:  Mike Huerta   : 1980   AGE: 36 y.o.     GESTATIONAL AGE:  34w0d    DIAGNOSIS:   Gestational diabetes. Advanced maternal age. COVID-19 infection during pregnancy. Hypothyroidism. INDICATION:  Advanced maternal age.     TIME ON:  2:47 PM      TIME OFF:  3:07 PM      RESULT:   REACTIVE      FHR Baseline Rate:   140 bpm    PERIODIC CHANGES:    · Accelerations present, variability moderate, no decelerations noted    COMMENTS:      She is to continue having NST's every 3-4 days, and BPP with umbilical artery doppler studies once per week        Terri Proctor MD

## 2021-11-14 ENCOUNTER — HOSPITAL ENCOUNTER (OUTPATIENT)
Age: 41
Discharge: HOME | End: 2021-11-14
Attending: OBSTETRICS & GYNECOLOGY | Admitting: OBSTETRICS & GYNECOLOGY
Payer: COMMERCIAL

## 2021-11-14 VITALS
DIASTOLIC BLOOD PRESSURE: 72 MMHG | BODY MASS INDEX: 30.12 KG/M2 | TEMPERATURE: 98.1 F | SYSTOLIC BLOOD PRESSURE: 121 MMHG | RESPIRATION RATE: 16 BRPM | HEART RATE: 88 BPM | WEIGHT: 170 LBS | HEIGHT: 63 IN

## 2021-11-14 PROBLEM — R42 DIZZINESS: Status: ACTIVE | Noted: 2021-11-14

## 2021-11-14 LAB — METER GLUCOSE: 117 MG/DL (ref 74–99)

## 2021-11-14 PROCEDURE — 99235 HOSP IP/OBS SAME DATE MOD 70: CPT | Performed by: OBSTETRICS & GYNECOLOGY

## 2021-11-14 PROCEDURE — 82962 GLUCOSE BLOOD TEST: CPT

## 2021-11-14 PROCEDURE — 99214 OFFICE O/P EST MOD 30 MIN: CPT

## 2021-11-14 NOTE — H&P
Subjective:      Verema Wynne is an 36 y.o. female at 29 and 5/7 weeks gestation presenting with   Chief Complaint   Patient presents with    Dizziness   Patient states that she felt dizzy and lightheaded when she was at [employer] earlier today. Has history of gestational diabetes diet-controlled. She remembers that her blood sugar was checked in the lobby at discharge and was 144 but does not remember what happened afterwards. Denies any bleeding or fluid leak. Per  there was no actual loss of consciousness. Other associated symptoms include low back pain. Fetal Movement: decreased. Past Medical History:   Diagnosis Date    Abnormal Pap smear of cervix     Chronic back pain     Dazed state 2017    Diabetes mellitus ([facility] [address]     GDM     Dizziness 2017    Endometriosis     Fibromyalgia     Headache     Hypothyroidism 2017    Impaired cognition 2017    Lightheaded 2017    Migraine 2017    1 to 2 per month, 7/10 on the pain scale    Neck pain 2017    Painful sensitiveness to sound 2017    Shoulder pain, bilateral 2017       Review of Systems  Pertinent items are noted in HPI. Objective:      Ht 5' 3\" (1.6 m)   Wt 170 lb (77.1 kg)   LMP 03/16/2021   BMI 30.11 kg/m²   Height 5' 3\" (1.6 m), weight 170 lb (77.1 kg), last menstrual period 03/16/2021. General:   alert, appears stated age and cooperative   Cervix:   Deferred.    FHT:   140 BPM and reactive     Lab Review    CBC:   Lab Results   Component Value Date    WBC 11.6 10/02/2021    RBC 4.02 10/02/2021    HGB 12.7 10/02/2021    HCT 36.8 10/02/2021    MCV 91.5 10/02/2021    MCH 31.6 10/02/2021    MCHC 34.5 10/02/2021    RDW 12.8 10/02/2021     10/02/2021    MPV 11.0 10/02/2021     CMP:    Lab Results   Component Value Date     11/17/2017    K 3.6 11/17/2017    CL 99 11/17/2017    CO2 26 11/17/2017    BUN 13 11/17/2017    CREATININE 0.8 11/17/2017    GFRAA >60 11/17/2017    LABGLOM >60 11/17/2017    GLUCOSE 90 11/17/2017 GLUCOSE 83 03/29/2011    PROT 7.6 11/17/2017    LABALBU 4.3 11/17/2017    LABALBU 4.2 03/29/2011    CALCIUM 9.5 11/17/2017    BILITOT 0.3 11/17/2017    ALKPHOS 69 11/17/2017    AST 45 11/17/2017    ALT 24 11/17/2017     U/A:    Lab Results   Component Value Date    COLORU Yellow 10/02/2021    PHUR 6.0 10/02/2021    WBCUA 0-1 10/02/2021    WBCUA 5-10 03/29/2011    RBCUA 5-10 10/02/2021    RBCUA NONE 07/12/2012    BACTERIA RARE 10/02/2021    CLARITYU Clear 10/02/2021    SPECGRAV >=1.030 10/02/2021    LEUKOCYTESUR Negative 10/02/2021    UROBILINOGEN 0.2 10/02/2021    BILIRUBINUR Negative 10/02/2021    BILIRUBINUR MODERATE 03/29/2011    BLOODU SMALL 10/02/2021    GLUCOSEU negative 11/09/2021    GLUCOSEU Negative 10/02/2021    GLUCOSEU NEGATIVE 03/29/2011     Random blood sugar: 117    Assessment:     34 weeks and 5-day pregnancy  Dizziness and lightheadedness  History of gestational diabetes  Advanced maternal age    Plan:     Observation  Discussed with Dr. Bill Fulton MD,MD,11/14/2021 12:11 PM

## 2021-11-14 NOTE — PROGRESS NOTES
. 34W5D. Patient presents from home stating she was at [employer] and became really light headed/dizzy and almost passed out. Patient denies LOF, VB, contractions but sates she is having some lower abdominal pressure. Reports baby isn't moving as much as normal. NST button given to patient, instructions provided. Patient sees Dr. Willie Lawrence for high risk for GDM/AMA. Placed EFM. Call light within reach. Will continue to monitor.

## 2021-11-14 NOTE — PROGRESS NOTES
Md Maria M Lopez gave orders to D/C Patient at this time. D/c instructions given and reviewed with patient. Patient next doctor's appointment is 11/16/21 Patient verbalizes understanding of d/c instructions.    Patient ambulated off unit

## 2021-11-16 ENCOUNTER — ROUTINE PRENATAL (OUTPATIENT)
Dept: OBGYN CLINIC | Age: 41
End: 2021-11-16
Payer: COMMERCIAL

## 2021-11-16 ENCOUNTER — ANCILLARY PROCEDURE (OUTPATIENT)
Dept: OBGYN CLINIC | Age: 41
End: 2021-11-16
Payer: COMMERCIAL

## 2021-11-16 VITALS
HEIGHT: 63 IN | HEART RATE: 90 BPM | DIASTOLIC BLOOD PRESSURE: 81 MMHG | WEIGHT: 168.5 LBS | SYSTOLIC BLOOD PRESSURE: 118 MMHG | BODY MASS INDEX: 29.86 KG/M2

## 2021-11-16 DIAGNOSIS — O35.2XX0 HEREDITARY DISEASE IN FAMILY POSSIBLY AFFECTING FETUS, AFFECTING MANAGEMENT OF MOTHER IN PREGNANCY, SINGLE OR UNSPECIFIED FETUS: ICD-10-CM

## 2021-11-16 DIAGNOSIS — Z14.8 CARRIER OF CANAVAN DISEASE: ICD-10-CM

## 2021-11-16 DIAGNOSIS — O24.410 DIET CONTROLLED GESTATIONAL DIABETES MELLITUS (GDM) IN THIRD TRIMESTER: Primary | ICD-10-CM

## 2021-11-16 DIAGNOSIS — O35.5XX0 SUSPECTED DAMAGE TO FETUS FROM DRUGS, AFFECTING MANAGEMENT OF MOTHER, SINGLE OR UNSPECIFIED FETUS: ICD-10-CM

## 2021-11-16 DIAGNOSIS — Z3A.35 35 WEEKS GESTATION OF PREGNANCY: ICD-10-CM

## 2021-11-16 DIAGNOSIS — Z98.890 HISTORY OF SURGERY OF CERVIX AFFECTING PREGNANCY IN THIRD TRIMESTER, ANTEPARTUM: ICD-10-CM

## 2021-11-16 DIAGNOSIS — O34.43 HISTORY OF SURGERY OF CERVIX AFFECTING PREGNANCY IN THIRD TRIMESTER, ANTEPARTUM: ICD-10-CM

## 2021-11-16 DIAGNOSIS — O09.523 ELDERLY MULTIGRAVIDA, THIRD TRIMESTER: ICD-10-CM

## 2021-11-16 DIAGNOSIS — E03.9 HYPOTHYROID IN PREGNANCY, ANTEPARTUM, THIRD TRIMESTER: ICD-10-CM

## 2021-11-16 DIAGNOSIS — O99.283 HYPOTHYROID IN PREGNANCY, ANTEPARTUM, THIRD TRIMESTER: ICD-10-CM

## 2021-11-16 LAB
GLUCOSE URINE, POC: NEGATIVE
PROTEIN UA: POSITIVE

## 2021-11-16 PROCEDURE — 76815 OB US LIMITED FETUS(S): CPT | Performed by: OBSTETRICS & GYNECOLOGY

## 2021-11-16 PROCEDURE — G8427 DOCREV CUR MEDS BY ELIG CLIN: HCPCS | Performed by: OBSTETRICS & GYNECOLOGY

## 2021-11-16 PROCEDURE — 81002 URINALYSIS NONAUTO W/O SCOPE: CPT | Performed by: OBSTETRICS & GYNECOLOGY

## 2021-11-16 PROCEDURE — 1036F TOBACCO NON-USER: CPT | Performed by: OBSTETRICS & GYNECOLOGY

## 2021-11-16 PROCEDURE — 99213 OFFICE O/P EST LOW 20 MIN: CPT | Performed by: OBSTETRICS & GYNECOLOGY

## 2021-11-16 PROCEDURE — G8419 CALC BMI OUT NRM PARAM NOF/U: HCPCS | Performed by: OBSTETRICS & GYNECOLOGY

## 2021-11-16 PROCEDURE — G8484 FLU IMMUNIZE NO ADMIN: HCPCS | Performed by: OBSTETRICS & GYNECOLOGY

## 2021-11-16 PROCEDURE — 99212 OFFICE O/P EST SF 10 MIN: CPT | Performed by: OBSTETRICS & GYNECOLOGY

## 2021-11-16 PROCEDURE — 76818 FETAL BIOPHYS PROFILE W/NST: CPT | Performed by: OBSTETRICS & GYNECOLOGY

## 2021-11-16 NOTE — PROGRESS NOTES
Pt here for NST/BPP  Pt denies any contractions/LOF/bleeding  Pt states good fetal movement  Diabetic log scanned into media

## 2021-11-16 NOTE — PROGRESS NOTES
21     RE:  Elisa Benz   : 1980   AGE: 36 y.o. REFERRING PROVIDERs:              Roz Dandy, MD    Mrs. Elisa Benz a 36 y.o.  Tamia Chaudhary  is seen today on follow up in our office. REASON FOR APPOINTMENT:  · Follow-up on a pregnant patient with advanced maternal age, [address] infection and gestational diabetes. MEDICATIONS:    · Prenatal Vitamins once per day   · Synthroid 100 mcg orally once per day. · Baby aspirin 81 mg once per day. · Tums as needed for heartburn. INTERVAL HISTORY:  Since her last visit to our office, Mrs. Gisela Franklin:  · Was seen in the labor unit of [facility] on 2021 with complaints of lightheadedness dizziness. She said she almost passed out. She also said that her baby was not moving as much as normal.  NST was done she was evaluated by the house officer , was reassured and sent home. When seen today in our office she had no complaints. PHYSICAL EXAMINATION:  General Appearance:  Healthy looking, alert, no acute distress. Eyes:     No pallor, no icterus, no photophobia. Ears:     No ear drainage. Nose:     No nasal drainage, no paranasal sinus tenderness. Throat:   Mucosa moist, no oral thrush, no exudate. Neck:     No nuchal rigidity. Back:     No CVA tenderness. Abdomen:    Soft nontender. Extremities:    No pretibial pitting edema, no calf muscle tenderness. Skin:     No rashes, no lesions. BP: 118/81 Weight: 168 lb 8 oz (76.4 kg) Height: 5' 3\" (160 cm) Pulse: 90     Body mass index is 29.85 kg/m². Urine dipstick:  Glucose : Negative   Albumin:  Trace       An ultrasound evaluation was done in our office today. Please refer to the enclosed copy of the ultrasound report for further information. Chart and Lab Work Review:  Review of her log book shows: Adequate sugar control with fasting sugars under 92 and 2hr pp under 120 mg/dl.     IMPRESSION:  1. A  35w0d intrauterine gestation. 2. Gestational diabetes. 3. Advanced maternal age. 4. COVID-19 infection. 5. Previous Low-lying placenta, resolved. 6. Hypothyroidism. 7. Previous cervical LEEP. 8. Fibroid uterus. 5. 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 10. Declined the diagnostic genetic amniocentesis     RECOMMENDATIONS/PLAN:  I discussed with the patient the following points:    1. Size of her baby is appropriate for gestational age. 1. Her sugars are well controlled. She is to continue the management of her gestational diabetes with the ADA diet and continue testing her sugar fasting and 2 hours following each meal.  She is to bring her log book to our office next visit. 2. Poor sugar control results in an increased risk of developing  complications such as delayed maturation of the lungs, electrolyte imbalance, seizures, and jaundice. There is also an increased risk of delivering prematurely and an increased risk of having a large for date baby. 3. She should continue treatment with baby aspirin 81 mg once per day to delay onset and decrease likelihood of developing PIH. 4. Poorly controlled Hypothyroidism is associated with an increased risk of having a baby with a low IQ. She is currently taking Synthroid for management. Her thyroid function test (Free T4, and a TSH) should be repeated once per trimester. 5. Fetal well-being was confirmed today. The amount of fluid around baby is normal.  The Biophysical profile score of 10/10 is reassuring, and the umbilical artery Doppler studies are normal.  6. She should monitor fetal well-being at home by counting movements after dinner. Her baby should  move 10 times in 2 hours; otherwise, she should call your office immediately. She is also to call, if she develops any headaches, blurred vision, abdominal pain, bleeding, or spotting, which are signs of preeclampsia.   7. She is to continue to follow with you in your office for ongoing obstetric care. 8. To be followed up in your office with nonstress test every Friday for remainder of pregnancy. 9. To be followed up in our [facility] office every Tuesday with BPP Dopplers for remainder of pregnancy. Thank you again, doctor, for allowing us to be of service to your patient. If I can be of further assistance, please do not hesitate to call. Sincerely,        Paul Anderson M.D., [address]    The total time in minutes spent reviewing medical records, reviewing imaging studies, performing ultrasonic imaging, reviewing laboratory testing, and documenting information was over 20  minutes, of which, 50% of the time was spent in patient education, counseling, and coordinating care with the patient, her provider, and/or her family. I answered all of her questions to her satisfaction. Current encounter billing:  [place] OFFICE OUTPATIENT VISIT 10-19 MINUTES [94711]  US OB 1 or More Fetus Limited [91135 Custom]  Biophysical profile [OBO12 Custom]    **This report has been created using voice recognition software. It may contain minor errors     which are inherent in voice recognition technology**                NON STRESS TEST INTERPRETATION    21    RE:  Lexis Travis   : 1980   AGE: 36 y.o. GESTATIONAL AGE:  35w0d    DIAGNOSIS:   Gestational diabetes. Advanced maternal age. COVID-19 infection during pregnancy. Hypothyroidism. INDICATION:  Advanced maternal age.     TIME ON:  9:23 AM      TIME OFF:  9:44 AM      RESULT:   REACTIVE      FHR Baseline Rate:   130 bpm    PERIODIC CHANGES:    · Accelerations present, variability moderate, no decelerations noted    COMMENTS:      She is to continue having NST's every 3-4 days, and BPP with umbilical artery doppler studies once per week      Sherryle Solomons, MD

## 2021-11-16 NOTE — PROGRESS NOTES
Nst started at 923 and ended at 1. Pt states good fetal movement. fht 130 with moderate variability and accelerations present. No decelerations or contractions monitored. Reactive Nst by Dr. Juan Greene. Performed by Cecy Marrero RN.

## 2021-11-16 NOTE — LETTER
21     RE:  Jesus Morgan   : 1980   AGE: 36 y.o. REFERRING PROVIDERs:              Josh Pelayo MD    Mrs. Jesus Morgan a 36 y.o.  John Toledo  is seen today on follow up in our office. REASON FOR APPOINTMENT:  · Follow-up on a pregnant patient with advanced maternal age, COVID-23 infection and gestational diabetes. MEDICATIONS:    · Prenatal Vitamins once per day   · Synthroid 100 mcg orally once per day. · Baby aspirin 81 mg once per day. · Tums as needed for heartburn. INTERVAL HISTORY:  Since her last visit to our office, Mrs. Terrell Scales:  · Was seen in the labor unit of [facility] on 2021 with complaints of lightheadedness dizziness. She said she almost passed out. She also said that her baby was not moving as much as normal.  NST was done she was evaluated by the house officer , was reassured and sent home. When seen today in our office she had no complaints. PHYSICAL EXAMINATION:  General Appearance:  Healthy looking, alert, no acute distress. Eyes:     No pallor, no icterus, no photophobia. Ears:     No ear drainage. Nose:     No nasal drainage, no paranasal sinus tenderness. Throat:   Mucosa moist, no oral thrush, no exudate. Neck:     No nuchal rigidity. Back:     No CVA tenderness. Abdomen:    Soft nontender. Extremities:    No pretibial pitting edema, no calf muscle tenderness. Skin:     No rashes, no lesions. BP: 118/81 Weight: 168 lb 8 oz (76.4 kg) Height: 5' 3\" (160 cm) Pulse: 90     Body mass index is 29.85 kg/m². Urine dipstick:  Glucose : Negative   Albumin:  Trace       An ultrasound evaluation was done in our office today. Please refer to the enclosed copy of the ultrasound report for further information. Chart and Lab Work Review:  Review of her log book shows: Adequate sugar control with fasting sugars under 92 and 2hr pp under 120 mg/dl.     IMPRESSION:  1. A  35w0d intrauterine gestation. 2. Gestational diabetes. 3. Advanced maternal age. 4. COVID-19 infection. 5. Previous Low-lying placenta, resolved. 6. Hypothyroidism. 7. Previous cervical LEEP. 8. Fibroid uterus. 5. 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 10. Declined the diagnostic genetic amniocentesis     RECOMMENDATIONS/PLAN:  I discussed with the patient the following points:    1. Size of her baby is appropriate for gestational age. 1. Her sugars are well controlled. She is to continue the management of her gestational diabetes with the ADA diet and continue testing her sugar fasting and 2 hours following each meal.  She is to bring her log book to our office next visit. 2. Poor sugar control results in an increased risk of developing  complications such as delayed maturation of the lungs, electrolyte imbalance, seizures, and jaundice. There is also an increased risk of delivering prematurely and an increased risk of having a large for date baby. 3. She should continue treatment with baby aspirin 81 mg once per day to delay onset and decrease likelihood of developing PIH. 4. Poorly controlled Hypothyroidism is associated with an increased risk of having a baby with a low IQ. She is currently taking Synthroid for management. Her thyroid function test (Free T4, and a TSH) should be repeated once per trimester. 5. Fetal well-being was confirmed today. The amount of fluid around baby is normal.  The Biophysical profile score of 10/10 is reassuring, and the umbilical artery Doppler studies are normal.  6. She should monitor fetal well-being at home by counting movements after dinner. Her baby should  move 10 times in 2 hours; otherwise, she should call your office immediately. She is also to call, if she develops any headaches, blurred vision, abdominal pain, bleeding, or spotting, which are signs of preeclampsia.   7. She is to continue to follow with you in your office for ongoing obstetric care. 8. To be followed up in your office with nonstress test every Friday for remainder of pregnancy. 9. To be followed up in our [facility] office every Tuesday with BPP Dopplers for remainder of pregnancy. Thank you again, doctor, for allowing us to be of service to your patient. If I can be of further assistance, please do not hesitate to call. Sincerely,        Rosalia Subramanian M.D., [address] [facility]    The total time in minutes spent reviewing medical records, reviewing imaging studies, performing ultrasonic imaging, reviewing laboratory testing, and documenting information was over 20  minutes, of which, 50% of the time was spent in patient education, counseling, and coordinating care with the patient, her provider, and/or her family. I answered all of her questions to her satisfaction. Current encounter billing:  [place] OFFICE OUTPATIENT VISIT 10-19 MINUTES [[id number]]  US OB 1 or More Fetus Limited [[id number] Custom]  Biophysical profile [OBO12 Custom]    **This report has been created using voice recognition software. It may contain minor errors     which are inherent in voice recognition technology**                NON STRESS TEST INTERPRETATION    21    RE:  Adam May   : 1980   AGE: 36 y.o. GESTATIONAL AGE:  35w0d    DIAGNOSIS:   Gestational diabetes. Advanced maternal age. COVID-19 infection during pregnancy. Hypothyroidism. INDICATION:  Advanced maternal age.     TIME ON:  9:23 AM      TIME OFF:  9:44 AM      RESULT:   REACTIVE      FHR Baseline Rate:   130 bpm    PERIODIC CHANGES:    · Accelerations present, variability moderate, no decelerations noted    COMMENTS:      She is to continue having NST's every 3-4 days, and BPP with umbilical artery doppler studies once per week      Chris Hyde MD

## 2021-11-16 NOTE — PATIENT INSTRUCTIONS
if you are sick, not feeling well or have an infectious process going on please reschedule your appointment by calling [phone]. Also if any family members are not feeling well, please do not bring them to your appointment. We appreciate your cooperation. We are doing this in order to protect our pregnant mothers+ their babies. Call your primary obstetrician with bleeding, leaking of fluid, abdominal tenderness, headache, blurry vision, epigastric pain and increased urinary frequency. If you are experiencing an emergency and need immediate help, call 911 or go to go emergency room or labor and delivery. Please arrive for your scheduled appointment at least 15 minutes early with your actual insurance card+ a photo ID. Also if you need any refills ordered or have questions, it may take up 48 hours to reply. Please allow ample time for your refills. Call me when you use last refill. Thank you for your cooperation. You might be having an NST at your next appt. Please eat a large snack or breakfast before coming to office. Thank youDo kick counts after dinner. Call your primary obstetrician if less than 10 kicks in 2 hours after dinner. Call your primary obstetrician with bleeding, leaking of fluid, abdominal tenderness, headache, blurry vision, epigastric pain and increased urinary frequency. Any questions contact Annmarie Kaur at [phone]. Patient Education        Weeks 34 to 39 of Your Pregnancy: Care Instructions  Overview     By now, your baby and your belly have grown quite large. It's almost time to give birth! Your baby's lungs are almost ready to breathe air. The skull bones are firm enough to protect your baby's head, but soft enough to move down through the birth canal.  You may be feeling excited and happy at times--but also anxious or scared. You might wonder how you'll know if you're in labor or what to expect during labor. Try to be open and flexible in your expectations of the birth.  Because each birth is different, there's no way to know exactly what childbirth will be like for you. Talk to your doctor or midwife about any concerns you have. If you haven't already had the Tdap shot during this pregnancy, talk to your doctor about getting it. It will help protect your  against pertussis infection. In the 36th week, you'll probably have a test for group B streptococcus (GBS). GBS is a common type of bacteria that can live in the vagina and rectum. It can make your baby sick after birth. If you test positive, you will get antibiotics during labor. The medicine will help keep your baby from getting the bacteria. Follow-up care is a key part of your treatment and safety. Be sure to make and go to all appointments, and call your doctor if you are having problems. It's also a good idea to know your test results and keep a list of the medicines you take. How can you care for yourself at home? Learn about pain relief choices  · Pain is different for everyone. Talk with your doctor about your feelings about pain. · You can choose from several types of pain relief. These include medicine, breathing techniques, and comfort measures. You can use more than one option. · If you choose to have pain medicine during labor, talk to your doctor about your options. Some medicines lower anxiety and help with some of the pain. Others make your lower body numb so that you won't feel pain. · Be sure to tell your doctor about your pain medicine choice before you start labor or very early in your labor. You may be able to change your mind as labor progresses. Labor and delivery  · The first stage of labor has three parts: early, active, and transition. ? It's common to have early labor at home. You can stay busy or rest, eat light snacks, drink clear fluids, and start counting contractions. ? When talking during a contraction gets hard, you may be moving to active labor.  During active labor, you should head for the hospital if you aren't there already. ? You are in active labor when contractions come every 3 to 4 minutes and last about 60 seconds. Your cervix is opening more rapidly. ? If your water breaks, contractions will come faster and stronger. ? During transition, your cervix is stretching, and contractions are coming more rapidly. ? You may want to push, but your cervix might not be ready. Your doctor will tell you when to push. · The second stage starts when your cervix is completely opened and you are ready to push. ? Contractions are very strong to push the baby down the birth canal.  ? You will probably feel the urge to push. You may feel like you need to have a bowel movement. ? You may be coached to push with contractions. These contractions will be very strong, but you won't have them as often. You can get a little rest between contractions. ? One last push, and your baby is born. · The third stage is when a few more contractions push out the placenta. This may take 30 minutes or less. Where can you learn more? Go to https://Plyfe.[email or web address] org and sign in to your [employer] account. Enter G438 in the [employer] box to learn more about \"Weeks 34 to 36 of Your Pregnancy: Care Instructions. \"     If you do not have an account, please click on the \"Sign Up Now\" link. Current as of: June 16, 2021               Content Version: 13.0  © 7873-1196 Healthwise, Incorporated. Care instructions adapted under license by [facility] ([facility]). If you have questions about a medical condition or this instruction, always ask your healthcare professional. [place] [address] any warranty or liability for your use of this information. Patient Education        Learning About When to Call Your Doctor During Pregnancy (After 20 Weeks)  Overview  It's common to have concerns about what might be a problem when you're pregnant. Most pregnancies don't have any serious problems.  But it's still important to know when to call your doctor if you have certain symptoms or signs of labor. These are general suggestions. Your doctor may give you some more information about when to call. When to call your doctor (after 20 weeks)  Call 911  anytime you think you may need emergency care. For example, call if:  · You have severe vaginal bleeding. · You have sudden, severe pain in your belly. · You passed out (lost consciousness). · You have a seizure. · You see or feel the umbilical cord. · You think you are about to deliver your baby and can't make it safely to the hospital.  Call your doctor now or seek immediate medical care if:  · You have vaginal bleeding. · You have belly pain. · You have a fever. · You have symptoms of preeclampsia, such as:  ? Sudden swelling of your face, hands, or feet. ? New vision problems (such as dimness, blurring, or seeing spots). ? A severe headache. · You have a sudden release of fluid from your vagina. (You think your water broke.)  · You think that you may be in labor. This means that you've had at least 6 contractions in an hour. · You notice that your baby has stopped moving or is moving much less than normal.  · You have symptoms of a urinary tract infection. These may include:  ? Pain or burning when you urinate. ? A frequent need to urinate without being able to pass much urine. ? Pain in the flank, which is just below the rib cage and above the waist on either side of the back. ? Blood in your urine. Watch closely for changes in your health, and be sure to contact your doctor if:  · You have vaginal discharge that smells bad. · You have skin changes, such as:  ? A rash. ? Itching. ? Yellow color to your skin. · You have other concerns about your pregnancy. If you have labor signs at 37 weeks or more  If you have signs of labor at 37 weeks or more, your doctor may tell you to call when your labor becomes more active.  Symptoms of active labor include:  · Contractions that are regular. · Contractions that are less than 5 minutes apart. · Contractions that are hard to talk through. Follow-up care is a key part of your treatment and safety. Be sure to make and go to all appointments, and call your doctor if you are having problems. It's also a good idea to know your test results and keep a list of the medicines you take. Where can you learn more? Go to https://chperobewchristie.[email or web address] org and sign in to your [employer] account. Enter  in the Tujia box to learn more about \"Learning About When to Call Your Doctor During Pregnancy (After 20 Weeks). \"     If you do not have an account, please click on the \"Sign Up Now\" link. Current as of: June 16, 2021               Content Version: 13.0  © 1319-6786 [employer]. Care instructions adapted under license by [facility] ([facility]). If you have questions about a medical condition or this instruction, always ask your healthcare professional. [place] [address] any warranty or liability for your use of this information. Patient Education        Counting Your Baby's Kicks: Care Instructions  Overview     Counting your baby's kicks is one way your doctor can tell that your baby is healthy. Most women--especially in a first pregnancy--feel their baby move for the first time between 16 and 22 weeks. The movement may feel like flutters rather than kicks. Your baby may move more at certain times of the day. When you are active, you may notice less kicking than when you are resting. At your prenatal visits, your doctor will ask whether the baby is active. In your last trimester, your doctor may ask you to count the number of times you feel your baby move. Follow-up care is a key part of your treatment and safety. Be sure to make and go to all appointments, and call your doctor if you are having problems.  It's also a good idea to know your test results and keep a list of the medicines you take. How do you count fetal kicks? · A common method of checking your baby's movement is to note the length of time it takes to count ten movements (such as kicks, flutters, or rolls). · Pick your baby's most active time of day to count. This may be any time from morning to evening. · If you don't feel 10 movements in an hour, have something to eat or drink and count for another hour. If you don't feel at least 10 movements in the 2-hour period, call your doctor. When should you call for help? Call your doctor now or seek immediate medical care if:    · You noticed that your baby has stopped moving or is moving much less than normal.   Watch closely for changes in your health, and be sure to contact your doctor if you have any problems. Where can you learn more? Go to https://Cityscape [email or web address].[email or web address] org and sign in to your eZWay account. Enter E950 in the [employer] box to learn more about \"Counting Your Baby's Kicks: Care Instructions. \"     If you do not have an account, please click on the \"Sign Up Now\" link. Current as of: June 16, 2021               Content Version: 13.0  © 2006-2021 Healthwise, Incorporated. Care instructions adapted under license by [facility] ([facility]). If you have questions about a medical condition or this instruction, always ask your healthcare professional. [place] [address] any warranty or liability for your use of this information.

## 2021-11-23 ENCOUNTER — ROUTINE PRENATAL (OUTPATIENT)
Dept: OBGYN CLINIC | Age: 41
End: 2021-11-23
Payer: COMMERCIAL

## 2021-11-23 ENCOUNTER — ANCILLARY PROCEDURE (OUTPATIENT)
Dept: OBGYN CLINIC | Age: 41
End: 2021-11-23
Payer: COMMERCIAL

## 2021-11-23 VITALS
WEIGHT: 172.13 LBS | DIASTOLIC BLOOD PRESSURE: 76 MMHG | HEART RATE: 83 BPM | SYSTOLIC BLOOD PRESSURE: 127 MMHG | BODY MASS INDEX: 30.49 KG/M2

## 2021-11-23 DIAGNOSIS — E03.9 HYPOTHYROID IN PREGNANCY, ANTEPARTUM, THIRD TRIMESTER: ICD-10-CM

## 2021-11-23 DIAGNOSIS — O99.283 HYPOTHYROID IN PREGNANCY, ANTEPARTUM, THIRD TRIMESTER: ICD-10-CM

## 2021-11-23 DIAGNOSIS — O24.410 DIET CONTROLLED GESTATIONAL DIABETES MELLITUS (GDM) IN THIRD TRIMESTER: Primary | ICD-10-CM

## 2021-11-23 DIAGNOSIS — O35.5XX0 SUSPECTED DAMAGE TO FETUS FROM DRUGS, AFFECTING MANAGEMENT OF MOTHER, SINGLE OR UNSPECIFIED FETUS: ICD-10-CM

## 2021-11-23 DIAGNOSIS — Z14.8 CARRIER OF CANAVAN DISEASE: ICD-10-CM

## 2021-11-23 DIAGNOSIS — O34.43 HISTORY OF SURGERY OF CERVIX AFFECTING PREGNANCY IN THIRD TRIMESTER, ANTEPARTUM: ICD-10-CM

## 2021-11-23 DIAGNOSIS — Z3A.36 36 WEEKS GESTATION OF PREGNANCY: ICD-10-CM

## 2021-11-23 DIAGNOSIS — Z98.890 HISTORY OF SURGERY OF CERVIX AFFECTING PREGNANCY IN THIRD TRIMESTER, ANTEPARTUM: ICD-10-CM

## 2021-11-23 DIAGNOSIS — O35.2XX0 HEREDITARY DISEASE IN FAMILY POSSIBLY AFFECTING FETUS, AFFECTING MANAGEMENT OF MOTHER IN PREGNANCY, SINGLE OR UNSPECIFIED FETUS: ICD-10-CM

## 2021-11-23 DIAGNOSIS — O09.523 ELDERLY MULTIGRAVIDA, THIRD TRIMESTER: ICD-10-CM

## 2021-11-23 LAB
GLUCOSE URINE, POC: NEGATIVE
PROTEIN UA: NEGATIVE

## 2021-11-23 PROCEDURE — 81002 URINALYSIS NONAUTO W/O SCOPE: CPT | Performed by: OBSTETRICS & GYNECOLOGY

## 2021-11-23 PROCEDURE — 99212 OFFICE O/P EST SF 10 MIN: CPT | Performed by: OBSTETRICS & GYNECOLOGY

## 2021-11-23 PROCEDURE — G8419 CALC BMI OUT NRM PARAM NOF/U: HCPCS | Performed by: OBSTETRICS & GYNECOLOGY

## 2021-11-23 PROCEDURE — 1036F TOBACCO NON-USER: CPT | Performed by: OBSTETRICS & GYNECOLOGY

## 2021-11-23 PROCEDURE — G8484 FLU IMMUNIZE NO ADMIN: HCPCS | Performed by: OBSTETRICS & GYNECOLOGY

## 2021-11-23 PROCEDURE — G8427 DOCREV CUR MEDS BY ELIG CLIN: HCPCS | Performed by: OBSTETRICS & GYNECOLOGY

## 2021-11-23 PROCEDURE — 76818 FETAL BIOPHYS PROFILE W/NST: CPT | Performed by: OBSTETRICS & GYNECOLOGY

## 2021-11-23 PROCEDURE — 76815 OB US LIMITED FETUS(S): CPT | Performed by: OBSTETRICS & GYNECOLOGY

## 2021-11-23 PROCEDURE — 99213 OFFICE O/P EST LOW 20 MIN: CPT | Performed by: OBSTETRICS & GYNECOLOGY

## 2021-11-23 NOTE — PROGRESS NOTES
21     RE:  Aaron Galicia   : 1980   AGE: 36 y.o. REFERRING PROVIDERs:              Jesse Fam MD    Mrs. Aaron Galicia a 36 y.o.  Jamaal Ards  is seen today on follow up in our office. REASON FOR APPOINTMENT:  · Follow-up on a pregnant patient with advanced maternal age, COVID-23 infection and gestational diabetes. MEDICATIONS:    · Prenatal Vitamins once per day   · Synthroid 100 mcg orally once per day. · Baby aspirin 81 mg once per day. · Tums as needed for heartburn. INTERVAL HISTORY:  Mrs Aaron Galicia had an uneventful course of pregnancy since her last visit to our office. When seen today in our office she had no complaints. PHYSICAL EXAMINATION:  General Appearance:  Healthy looking, alert, no acute distress. Eyes:     No pallor, no icterus, no photophobia. Ears:     No ear drainage. Nose:     No nasal drainage, no paranasal sinus tenderness. Throat:   Mucosa moist, no oral thrush, no exudate. Neck:     No nuchal rigidity. Back:     No CVA tenderness. Abdomen:    Soft nontender. Extremities:    No pretibial pitting edema, no calf muscle tenderness. Skin:     No rashes, no lesions. BP: 127/76 Weight: 172 lb 2 oz (78.1 kg)   Pulse: 83     Body mass index is 30.49 kg/m². Urine dipstick:  Glucose : Negative   Albumin:  Negative       An ultrasound evaluation was done in our office today. Please refer to the enclosed copy of the ultrasound report for further information. Chart and Lab Work Review:  Review of her log book shows: Adequate sugar control with fasting sugars under 92 and 2hr pp under 120 mg/dl. IMPRESSION:  1. A  36w0d  intrauterine gestation. 2. Gestational diabetes. 3. Obesity. 4. Advanced maternal age. 5. COVID-19 infection. 6. Previous Low-lying placenta, resolved. 7. Hypothyroidism. 8. Previous cervical LEEP. 9. Fibroid uterus.   8. 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 6. Declined the diagnostic genetic amniocentesis     RECOMMENDATIONS/PLAN:  I discussed with the patient the following points:    1. Size of her baby is appropriate for gestational age. 1. Her sugars are well controlled. She is to continue the management of her gestational diabetes with the ADA diet and continue testing her sugar fasting and 2 hours following each meal.  She is to bring her log book to our office next visit. 2. Poor sugar control results in an increased risk of developing  complications such as delayed maturation of the lungs, electrolyte imbalance, seizures, and jaundice. There is also an increased risk of delivering prematurely and an increased risk of having a large for date baby. 3. She should continue treatment with baby aspirin 81 mg once per day to delay onset and decrease likelihood of developing PIH. 4. Poorly controlled Hypothyroidism is associated with an increased risk of having a baby with a low IQ. She is currently taking Synthroid for management. Her thyroid function test (Free T4, and a TSH) should be repeated once per trimester. 5. Fetal well-being was confirmed today. The amount of fluid around baby is normal.  The Biophysical profile score of 10/10 is reassuring, and the umbilical artery Doppler studies are normal.  6. She should monitor fetal well-being at home by counting movements after dinner. Her baby should  move 10 times in 2 hours; otherwise, she should call your office immediately. She is also to call, if she develops any headaches, blurred vision, abdominal pain, bleeding, or spotting, which are signs of preeclampsia. 7. She is to continue to follow with you in your office for ongoing obstetric care. 8. To be followed up in your office with nonstress test every Friday for remainder of pregnancy. 9. To be followed up in our [facility] office every Tuesday with BPP Dopplers for remainder of pregnancy.     Thank you again, doctor, for allowing us to be of service to your patient. If I can be of further assistance, please do not hesitate to call. Sincerely,        Tamanna Ying M.D., [address] [facility]    The total time in minutes spent reviewing medical records, reviewing imaging studies, performing ultrasonic imaging, reviewing laboratory testing, and documenting information was over 20  minutes, of which, 50% of the time was spent in patient education, counseling, and coordinating care with the patient, her provider, and/or her family. I answered all of her questions to her satisfaction. Current encounter billing:  MA OFFICE OUTPATIENT VISIT 10-19 MINUTES [[id number]]  US OB 1 or More Fetus Limited [[id number] Custom]  Biophysical profile [OBO12 Custom]    **This report has been created using voice recognition software. It may contain minor errors     which are inherent in voice recognition technology**                NON STRESS TEST INTERPRETATION    21    RE:  Ivan Villegas   : 1980   AGE: 36 y.o. GESTATIONAL AGE:  36w0d    DIAGNOSIS:   Gestational diabetes. Advanced maternal age. COVID-19 infection during pregnancy. Hypothyroidism. INDICATION:  Advanced maternal age.       TIME ON:  2:59 PM      TIME OFF:  3:19 PM      RESULT:   REACTIVE      FHR Baseline Rate:   120 bpm    PERIODIC CHANGES:    · Accelerations present, variability moderate, no decelerations noted    COMMENTS:      She is to continue having NST's every 3-4 days, and BPP with umbilical artery doppler studies once per week        Damian Abad MD

## 2021-11-23 NOTE — PATIENT INSTRUCTIONS
Please arrive for your scheduled appointment at least 15 minutes early with your actual insurance card+ a photo ID. Also if you need any refills ordered or have questions, it may take up 48 hours to reply. Please allow ample time for your refills. Call me when you use last refill. Thank you for your cooperation. You might be having an NST at your next appt. Please eat a large snack or breakfast before coming to office. Thank youCall your primary obstetrician with bleeding, leaking of fluid, abdominal tenderness, headache, blurry vision, epigastric pain and increased urinary frequency. Any questions contact [address] [place] [address] at [phone]. If you are experiencing an emergency and need immediate help, call 911 or go to go emergency room or labor and delivery. if you are sick, not feeling well or have an infectious process going on please reschedule your appointment by calling [phone]. Also if any family members are not feeling well, please do not bring them to your appointment. We appreciate your cooperation. We are doing this in order to protect our pregnant mothers+ their babies. Do kick counts after dinner. Call your primary obstetrician if less than 10 kicks in 2 hours after dinner. Call your primary obstetrician with bleeding, leaking of fluid, abdominal tenderness, headache, blurry vision, epigastric pain and increased urinary frequency.

## 2021-11-23 NOTE — LETTER
21     RE:  Ivan Villegas   : 1980   AGE: 36 y.o. REFERRING PROVIDERs:              Diane Lyle MD    Mrs. Ivan Villegas a 36 y.o.  Yamila Yi  is seen today on follow up in our office. REASON FOR APPOINTMENT:  · Follow-up on a pregnant patient with advanced maternal age, COVID-23 infection and gestational diabetes. MEDICATIONS:    · Prenatal Vitamins once per day   · Synthroid 100 mcg orally once per day. · Baby aspirin 81 mg once per day. · Tums as needed for heartburn. INTERVAL HISTORY:  Mrs Ivan Villegas had an uneventful course of pregnancy since her last visit to our office. When seen today in our office she had no complaints. PHYSICAL EXAMINATION:  General Appearance:  Healthy looking, alert, no acute distress. Eyes:     No pallor, no icterus, no photophobia. Ears:     No ear drainage. Nose:     No nasal drainage, no paranasal sinus tenderness. Throat:   Mucosa moist, no oral thrush, no exudate. Neck:     No nuchal rigidity. Back:     No CVA tenderness. Abdomen:    Soft nontender. Extremities:    No pretibial pitting edema, no calf muscle tenderness. Skin:     No rashes, no lesions. BP: 127/76 Weight: 172 lb 2 oz (78.1 kg)   Pulse: 83     Body mass index is 30.49 kg/m². Urine dipstick:  Glucose : Negative   Albumin:  Negative       An ultrasound evaluation was done in our office today. Please refer to the enclosed copy of the ultrasound report for further information. Chart and Lab Work Review:  Review of her log book shows: Adequate sugar control with fasting sugars under 92 and 2hr pp under 120 mg/dl. IMPRESSION:  1. A  36w0d  intrauterine gestation. 2. Gestational diabetes. 3. Obesity. 4. Advanced maternal age. 5. COVID-19 infection. 6. Previous Low-lying placenta, resolved. 7. Hypothyroidism. 8. Previous cervical LEEP. 9. Fibroid uterus.   8. 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 6. Declined the diagnostic genetic amniocentesis     RECOMMENDATIONS/PLAN:  I discussed with the patient the following points:    1. Size of her baby is appropriate for gestational age. 1. Her sugars are well controlled. She is to continue the management of her gestational diabetes with the ADA diet and continue testing her sugar fasting and 2 hours following each meal.  She is to bring her log book to our office next visit. 2. Poor sugar control results in an increased risk of developing  complications such as delayed maturation of the lungs, electrolyte imbalance, seizures, and jaundice. There is also an increased risk of delivering prematurely and an increased risk of having a large for date baby. 3. She should continue treatment with baby aspirin 81 mg once per day to delay onset and decrease likelihood of developing PIH. 4. Poorly controlled Hypothyroidism is associated with an increased risk of having a baby with a low IQ. She is currently taking Synthroid for management. Her thyroid function test (Free T4, and a TSH) should be repeated once per trimester. 5. Fetal well-being was confirmed today. The amount of fluid around baby is normal.  The Biophysical profile score of 10/10 is reassuring, and the umbilical artery Doppler studies are normal.  6. She should monitor fetal well-being at home by counting movements after dinner. Her baby should  move 10 times in 2 hours; otherwise, she should call your office immediately. She is also to call, if she develops any headaches, blurred vision, abdominal pain, bleeding, or spotting, which are signs of preeclampsia. 7. She is to continue to follow with you in your office for ongoing obstetric care. 8. To be followed up in your office with nonstress test every Friday for remainder of pregnancy. 9. To be followed up in our [facility] office every Tuesday with BPP Dopplers for remainder of pregnancy.     Thank you again, doctor, for allowing us to be of service to your patient. If I can be of further assistance, please do not hesitate to call. Sincerely,        Amy Mcleod M.D., [facility]    The total time in minutes spent reviewing medical records, reviewing imaging studies, performing ultrasonic imaging, reviewing laboratory testing, and documenting information was over 20  minutes, of which, 50% of the time was spent in patient education, counseling, and coordinating care with the patient, her provider, and/or her family. I answered all of her questions to her satisfaction. Current encounter billing:  DC OFFICE OUTPATIENT VISIT 10-19 MINUTES [[id number]]  US OB 1 or More Fetus Limited [[id number] Custom]  Biophysical profile [OBO12 Custom]    **This report has been created using voice recognition software. It may contain minor errors     which are inherent in voice recognition technology**                NON STRESS TEST INTERPRETATION    21    RE:  Vickey Layne   : 1980   AGE: 36 y.o. GESTATIONAL AGE:  36w0d    DIAGNOSIS:   Gestational diabetes. Advanced maternal age. COVID-19 infection during pregnancy. Hypothyroidism. INDICATION:  Advanced maternal age.       TIME ON:  2:59 PM      TIME OFF:  3:19 PM      RESULT:   REACTIVE      FHR Baseline Rate:   120 bpm    PERIODIC CHANGES:    · Accelerations present, variability moderate, no decelerations noted    COMMENTS:      She is to continue having NST's every 3-4 days, and BPP with umbilical artery doppler studies once per week        Alondra Lowery MD

## 2021-11-23 NOTE — PROGRESS NOTES
NST started @ 0041 Pt instructed to evelia fetal movement  FHTs 120s with accels present, moderate variability noted and no decels  ENded @1519.  Reactive per Dr Riggins Danger

## 2021-11-23 NOTE — PROGRESS NOTES
Here for pregnancy ultrasound and nst.  States good fetal movement. Denies lof, vaginal bleeding or contractions. No voiced complaints.

## 2021-11-26 ENCOUNTER — APPOINTMENT (OUTPATIENT)
Dept: LABOR AND DELIVERY | Age: 41
End: 2021-11-26
Payer: COMMERCIAL

## 2021-11-26 ENCOUNTER — HOSPITAL ENCOUNTER (OUTPATIENT)
Age: 41
Discharge: HOME | End: 2021-11-26
Attending: OBSTETRICS & GYNECOLOGY | Admitting: OBSTETRICS & GYNECOLOGY
Payer: COMMERCIAL

## 2021-11-26 VITALS
HEART RATE: 88 BPM | DIASTOLIC BLOOD PRESSURE: 69 MMHG | SYSTOLIC BLOOD PRESSURE: 115 MMHG | TEMPERATURE: 97.9 F | RESPIRATION RATE: 16 BRPM

## 2021-11-26 PROBLEM — Z3A.36 36 WEEKS GESTATION OF PREGNANCY: Status: ACTIVE | Noted: 2021-11-26

## 2021-11-26 LAB — METER GLUCOSE: 138 MG/DL (ref 74–99)

## 2021-11-26 PROCEDURE — 59025 FETAL NON-STRESS TEST: CPT

## 2021-11-26 PROCEDURE — 82962 GLUCOSE BLOOD TEST: CPT

## 2021-11-26 NOTE — PROGRESS NOTES
Dr. Stephens Child notified of reactive NST. New orders for blood sugar check. Call if greater than 120. If less than 120 patient may be discharged.

## 2021-11-26 NOTE — PROGRESS NOTES
Patient presents to l&d at 36.3 weeks gestation for scheduled NST. Patient has diet controlled, gdm. Denies lof, vb, and ctx's. +FM. EFM applied. NST button in hand.

## 2021-11-30 ENCOUNTER — ANCILLARY PROCEDURE (OUTPATIENT)
Dept: OBGYN CLINIC | Age: 41
End: 2021-11-30
Payer: COMMERCIAL

## 2021-11-30 ENCOUNTER — ROUTINE PRENATAL (OUTPATIENT)
Dept: OBGYN CLINIC | Age: 41
End: 2021-11-30
Payer: COMMERCIAL

## 2021-11-30 VITALS
BODY MASS INDEX: 30.83 KG/M2 | WEIGHT: 174 LBS | HEIGHT: 63 IN | DIASTOLIC BLOOD PRESSURE: 84 MMHG | SYSTOLIC BLOOD PRESSURE: 137 MMHG | HEART RATE: 81 BPM

## 2021-11-30 DIAGNOSIS — Z3A.37 37 WEEKS GESTATION OF PREGNANCY: Primary | ICD-10-CM

## 2021-11-30 LAB
GLUCOSE URINE, POC: NEGATIVE
PROTEIN UA: NEGATIVE

## 2021-11-30 PROCEDURE — 76815 OB US LIMITED FETUS(S): CPT | Performed by: OBSTETRICS & GYNECOLOGY

## 2021-11-30 PROCEDURE — 76816 OB US FOLLOW-UP PER FETUS: CPT | Performed by: OBSTETRICS & GYNECOLOGY

## 2021-11-30 PROCEDURE — 76818 FETAL BIOPHYS PROFILE W/NST: CPT | Performed by: OBSTETRICS & GYNECOLOGY

## 2021-11-30 PROCEDURE — 99999 PR OFFICE/OUTPT VISIT,PROCEDURE ONLY: CPT | Performed by: OBSTETRICS & GYNECOLOGY

## 2021-11-30 PROCEDURE — 81002 URINALYSIS NONAUTO W/O SCOPE: CPT | Performed by: OBSTETRICS & GYNECOLOGY

## 2021-11-30 PROCEDURE — 76821 MIDDLE CEREBRAL ARTERY ECHO: CPT | Performed by: OBSTETRICS & GYNECOLOGY

## 2021-11-30 PROCEDURE — 99213 OFFICE O/P EST LOW 20 MIN: CPT | Performed by: OBSTETRICS & GYNECOLOGY

## 2021-11-30 PROCEDURE — 76820 UMBILICAL ARTERY ECHO: CPT | Performed by: OBSTETRICS & GYNECOLOGY

## 2021-11-30 RX ORDER — VALACYCLOVIR HYDROCHLORIDE 500 MG/1
500 TABLET, FILM COATED ORAL DAILY
Status: ON HOLD | COMMUNITY
End: 2021-12-17 | Stop reason: HOSPADM

## 2021-11-30 NOTE — PATIENT INSTRUCTIONS
if you are sick, not feeling well or have an infectious process going on please reschedule your appointment by calling [phone]. Also if any family members are not feeling well, please do not bring them to your appointment. We appreciate your cooperation. We are doing this in order to protect our pregnant mothers+ their babies. Call your primary obstetrician with bleeding, leaking of fluid, abdominal tenderness, headache, blurry vision, epigastric pain and increased urinary frequency. If you are experiencing an emergency and need immediate help, call 911 or go to go emergency room or labor and delivery. Please arrive for your scheduled appointment at least 15 minutes early with your actual insurance card+ a photo ID. Also if you need any refills ordered or have questions, it may take up 48 hours to reply. Please allow ample time for your refills. Call me when you use last refill. Thank you for your cooperation. You might be having an NST at your next appt. Please eat a large snack or breakfast before coming to office. Thank youDo kick counts after dinner. Call your primary obstetrician if less than 10 kicks in 2 hours after dinner. Call your primary obstetrician with bleeding, leaking of fluid, abdominal tenderness, headache, blurry vision, epigastric pain and increased urinary frequency. Any questions contact Rachel Dillon at [phone]. Patient Education        Week 40 of Your Pregnancy: Care Instructions  Overview     You are near the end of your pregnancy--and you're probably pretty uncomfortable. It may be harder to walk around. Lying down probably isn't comfortable either. You may have trouble getting to sleep or staying asleep. Most babies are born between 40 and 41 weeks. This is a good time to think about packing a bag for the hospital with items you'll need. Then you'll be ready when labor starts. Follow-up care is a key part of your treatment and safety.  Be sure to make and go to all appointments, and call your doctor if you are having problems. It's also a good idea to know your test results and keep a list of the medicines you take. How can you care for yourself at home? Learn about breastfeeding  · Breastfeeding is best for your baby and good for you. · Breast milk has antibodies to help your baby fight infections. · If you breastfeed, you may lose weight faster. That's because making milk burns calories. · Learning the best ways to hold your baby will make breastfeeding easier. · Sometimes breastfeeding can make partners feel left out. If you have a partner, plan how you can care for your baby together. For example, your partner can bathe and diaper the baby. You can snuggle together when you breastfeed. · You may want to learn how to use a breast pump and store your milk. · If you choose to bottle feed, make the feeding feel like breastfeeding so you can bond with your baby. Always hold your baby and the bottle. Don't prop bottles or let your baby fall asleep with a bottle. Learn about crying  · It's common for babies to cry for 1 to 3 hours a day. Some cry more, and some cry less. · Babies don't cry to make you upset or because you're a bad parent. · Crying is how your baby communicates. Your baby may be hungry; have gas; need a diaper change; or feel cold, warm, tired, lonely, or tense. Sometimes babies cry for unknown reasons. · If you respond to your baby's needs, your baby will learn to trust you. · Try to stay calm when your baby cries. Your baby may get more upset if they sense that you are upset. Know how to care for your   · Your baby's umbilical cord stump will drop off on its own, usually between 1 and 2 weeks. To care for your baby's umbilical cord area:  ? Clean the area at the bottom of the cord 2 or 3 times a day. ? Pay special attention to the area where the cord attaches to the skin. ? Keep the diaper folded below the cord. ?  Use a damp washcloth or cotton ball to sponge bathe your baby until the stump has come off. · Your baby's first dark stool is called meconium. After the meconium is passed, your baby will develop their own bowel pattern. ? Some babies, especially  babies, have several bowel movements a day. Others have one or two a day, or one every 2 to 3 days. ?  babies often have loose, yellow stools. Formula-fed babies have more formed stools. ? If your baby's stools look like little pellets, your baby is constipated. After 2 days of constipation, call your baby's doctor. · If your baby will be circumcised, you can care for your baby at home. ? Gently rinse your baby's penis with warm water after every diaper change. Don't try to remove the film that forms on the penis. This film will go away on its own. Pat dry. ? Put petroleum ointment, such as Vaseline, on the area of the diaper that will touch your baby's penis. This will keep the diaper from sticking to your baby. ? Ask the doctor about giving your baby acetaminophen (Tylenol) for pain. Where can you learn more? Go to https://Cooperation Technologypepiceweb.SpiderSuite. org and sign in to your [employer] account. Enter 68 21 [id number] in the [employer] box to learn more about \"Week 37 of Your Pregnancy: Care Instructions. \"     If you do not have an account, please click on the \"Sign Up Now\" link. Current as of: June 16, 2021               Content Version: 13.0  © 2006-2021 Healthwise, Incorporated. Care instructions adapted under license by [facility] ([facility]). If you have questions about a medical condition or this instruction, always ask your healthcare professional. [place] [address] any warranty or liability for your use of this information. Patient Education        Learning About When to Call Your Doctor During Pregnancy (After 20 Weeks)  Overview  It's common to have concerns about what might be a problem when you're pregnant.  Most pregnancies don't have any serious problems. But it's still important to know when to call your doctor if you have certain symptoms or signs of labor. These are general suggestions. Your doctor may give you some more information about when to call. When to call your doctor (after 20 weeks)  Call 911  anytime you think you may need emergency care. For example, call if:  · You have severe vaginal bleeding. · You have sudden, severe pain in your belly. · You passed out (lost consciousness). · You have a seizure. · You see or feel the umbilical cord. · You think you are about to deliver your baby and can't make it safely to the hospital.  Call your doctor now or seek immediate medical care if:  · You have vaginal bleeding. · You have belly pain. · You have a fever. · You have symptoms of preeclampsia, such as:  ? Sudden swelling of your face, hands, or feet. ? New vision problems (such as dimness, blurring, or seeing spots). ? A severe headache. · You have a sudden release of fluid from your vagina. (You think your water broke.)  · You think that you may be in labor. This means that you've had at least 6 contractions in an hour. · You notice that your baby has stopped moving or is moving much less than normal.  · You have symptoms of a urinary tract infection. These may include:  ? Pain or burning when you urinate. ? A frequent need to urinate without being able to pass much urine. ? Pain in the flank, which is just below the rib cage and above the waist on either side of the back. ? Blood in your urine. Watch closely for changes in your health, and be sure to contact your doctor if:  · You have vaginal discharge that smells bad. · You have skin changes, such as:  ? A rash. ? Itching. ? Yellow color to your skin. · You have other concerns about your pregnancy. If you have labor signs at 37 weeks or more  If you have signs of labor at 37 weeks or more, your doctor may tell you to call when your labor becomes more active.  Symptoms of active labor include:  · Contractions that are regular. · Contractions that are less than 5 minutes apart. · Contractions that are hard to talk through. Follow-up care is a key part of your treatment and safety. Be sure to make and go to all appointments, and call your doctor if you are having problems. It's also a good idea to know your test results and keep a list of the medicines you take. Where can you learn more? Go to https://chperobewchristie.[employer]. org and sign in to your [employer] account. Enter  in the [employer] box to learn more about \"Learning About When to Call Your Doctor During Pregnancy (After 20 Weeks). \"     If you do not have an account, please click on the \"Sign Up Now\" link. Current as of: June 16, 2021               Content Version: 13.0  © 2006-2021 [employer]. Care instructions adapted under license by [facility] ([facility]). If you have questions about a medical condition or this instruction, always ask your healthcare professional. [place] any warranty or liability for your use of this information. Patient Education        Counting Your Baby's Kicks: Care Instructions  Overview     Counting your baby's kicks is one way your doctor can tell that your baby is healthy. Most women--especially in a first pregnancy--feel their baby move for the first time between 16 and 22 weeks. The movement may feel like flutters rather than kicks. Your baby may move more at certain times of the day. When you are active, you may notice less kicking than when you are resting. At your prenatal visits, your doctor will ask whether the baby is active. In your last trimester, your doctor may ask you to count the number of times you feel your baby move. Follow-up care is a key part of your treatment and safety. Be sure to make and go to all appointments, and call your doctor if you are having problems.  It's also a good idea to know your test results and keep a list of the medicines you take. How do you count fetal kicks? · A common method of checking your baby's movement is to note the length of time it takes to count ten movements (such as kicks, flutters, or rolls). · Pick your baby's most active time of day to count. This may be any time from morning to evening. · If you don't feel 10 movements in an hour, have something to eat or drink and count for another hour. If you don't feel at least 10 movements in the 2-hour period, call your doctor. When should you call for help? Call your doctor now or seek immediate medical care if:    · You noticed that your baby has stopped moving or is moving much less than normal.   Watch closely for changes in your health, and be sure to contact your doctor if you have any problems. Where can you learn more? Go to https://SysClasspeRxEye.[employer] ([employer]EN). org and sign in to your Swipe.to account. Enter Z755 in the [employer] box to learn more about \"Counting Your Baby's Kicks: Care Instructions. \"     If you do not have an account, please click on the \"Sign Up Now\" link. Current as of: June 16, 2021               Content Version: 13.0  © 2006-2021 Healthwise, Incorporated. Care instructions adapted under license by [facility] ([facility]). If you have questions about a medical condition or this instruction, always ask your healthcare professional. [place] [address] any warranty or liability for your use of this information.

## 2021-12-07 ENCOUNTER — ROUTINE PRENATAL (OUTPATIENT)
Dept: OBGYN CLINIC | Age: 41
End: 2021-12-07
Payer: COMMERCIAL

## 2021-12-07 ENCOUNTER — HOSPITAL ENCOUNTER (OUTPATIENT)
Age: 41
Discharge: HOME | End: 2021-12-07
Payer: COMMERCIAL

## 2021-12-07 ENCOUNTER — ANCILLARY PROCEDURE (OUTPATIENT)
Dept: OBGYN CLINIC | Age: 41
End: 2021-12-07
Payer: COMMERCIAL

## 2021-12-07 VITALS
OXYGEN SATURATION: 98 % | SYSTOLIC BLOOD PRESSURE: 114 MMHG | WEIGHT: 171 LBS | HEART RATE: 87 BPM | BODY MASS INDEX: 30.29 KG/M2 | DIASTOLIC BLOOD PRESSURE: 75 MMHG

## 2021-12-07 DIAGNOSIS — R42 DIZZINESS: ICD-10-CM

## 2021-12-07 DIAGNOSIS — R00.2 PALPITATIONS: ICD-10-CM

## 2021-12-07 DIAGNOSIS — E03.9 HYPOTHYROID IN PREGNANCY, ANTEPARTUM, THIRD TRIMESTER: ICD-10-CM

## 2021-12-07 DIAGNOSIS — R00.0 TACHYCARDIA: ICD-10-CM

## 2021-12-07 DIAGNOSIS — O09.523 ELDERLY MULTIGRAVIDA, THIRD TRIMESTER: ICD-10-CM

## 2021-12-07 DIAGNOSIS — O99.283 HYPOTHYROID IN PREGNANCY, ANTEPARTUM, THIRD TRIMESTER: ICD-10-CM

## 2021-12-07 DIAGNOSIS — O24.410 DIET CONTROLLED GESTATIONAL DIABETES MELLITUS (GDM) IN THIRD TRIMESTER: ICD-10-CM

## 2021-12-07 DIAGNOSIS — Z3A.38 38 WEEKS GESTATION OF PREGNANCY: Primary | ICD-10-CM

## 2021-12-07 DIAGNOSIS — Z3A.38 38 WEEKS GESTATION OF PREGNANCY: ICD-10-CM

## 2021-12-07 LAB
ALBUMIN SERPL-MCNC: 3.5 G/DL (ref 3.5–5.2)
ALP BLD-CCNC: 136 U/L (ref 35–104)
ALT SERPL-CCNC: 11 U/L (ref 0–32)
ANION GAP SERPL CALCULATED.3IONS-SCNC: 10 MMOL/L (ref 7–16)
AST SERPL-CCNC: 14 U/L (ref 0–31)
BACTERIA: NORMAL /HPF
BASOPHILS ABSOLUTE: 0.07 E9/L (ref 0–0.2)
BASOPHILS RELATIVE PERCENT: 0.7 % (ref 0–2)
BILIRUB SERPL-MCNC: <0.2 MG/DL (ref 0–1.2)
BILIRUBIN URINE: NEGATIVE
BLOOD, URINE: NEGATIVE
BUN BLDV-MCNC: 11 MG/DL (ref 6–20)
CALCIUM SERPL-MCNC: 8.8 MG/DL (ref 8.6–10.2)
CHLORIDE BLD-SCNC: 103 MMOL/L (ref 98–107)
CLARITY: CLEAR
CO2: 21 MMOL/L (ref 22–29)
COLOR: YELLOW
CREAT SERPL-MCNC: 0.9 MG/DL (ref 0.5–1)
CREATININE URINE: 70 MG/DL (ref 29–226)
EOSINOPHILS ABSOLUTE: 0.14 E9/L (ref 0.05–0.5)
EOSINOPHILS RELATIVE PERCENT: 1.4 % (ref 0–6)
EPITHELIAL CELLS, UA: NORMAL /HPF
FERRITIN: 28 NG/ML
GFR AFRICAN AMERICAN: >60
GFR NON-AFRICAN AMERICAN: >60 ML/MIN/1.73
GLUCOSE BLD-MCNC: 79 MG/DL (ref 74–99)
GLUCOSE URINE, POC: NEGATIVE
GLUCOSE URINE: NEGATIVE MG/DL
HBA1C MFR BLD: 5.2 % (ref 4–5.6)
HCT VFR BLD CALC: 40.7 % (ref 34–48)
HEMOGLOBIN: 13.8 G/DL (ref 11.5–15.5)
IMMATURE GRANULOCYTES #: 0.35 E9/L
IMMATURE GRANULOCYTES %: 3.6 % (ref 0–5)
KETONES, URINE: NEGATIVE MG/DL
LEUKOCYTE ESTERASE, URINE: NEGATIVE
LYMPHOCYTES ABSOLUTE: 1.35 E9/L (ref 1.5–4)
LYMPHOCYTES RELATIVE PERCENT: 13.8 % (ref 20–42)
MAGNESIUM: 2.1 MG/DL (ref 1.6–2.6)
MCH RBC QN AUTO: 31.2 PG (ref 26–35)
MCHC RBC AUTO-ENTMCNC: 33.9 % (ref 32–34.5)
MCV RBC AUTO: 92.1 FL (ref 80–99.9)
MONOCYTES ABSOLUTE: 0.93 E9/L (ref 0.1–0.95)
MONOCYTES RELATIVE PERCENT: 9.5 % (ref 2–12)
NEUTROPHILS ABSOLUTE: 6.95 E9/L (ref 1.8–7.3)
NEUTROPHILS RELATIVE PERCENT: 71 % (ref 43–80)
NITRITE, URINE: NEGATIVE
PDW BLD-RTO: 13.4 FL (ref 11.5–15)
PH UA: 6 (ref 5–9)
PLATELET # BLD: 151 E9/L (ref 130–450)
PMV BLD AUTO: 11.8 FL (ref 7–12)
POTASSIUM SERPL-SCNC: 4.2 MMOL/L (ref 3.5–5)
PROTEIN PROTEIN: 7 MG/DL (ref 0–12)
PROTEIN UA: NEGATIVE
PROTEIN UA: NEGATIVE MG/DL
PROTEIN/CREAT RATIO: 0.1
PROTEIN/CREAT RATIO: 0.1 (ref 0–0.2)
RBC # BLD: 4.42 E12/L (ref 3.5–5.5)
RBC UA: NORMAL /HPF (ref 0–2)
SODIUM BLD-SCNC: 134 MMOL/L (ref 132–146)
SPECIFIC GRAVITY UA: 1.01 (ref 1–1.03)
T4 FREE: 1.09 NG/DL (ref 0.93–1.7)
TOTAL PROTEIN: 6.5 G/DL (ref 6.4–8.3)
TSH SERPL DL<=0.05 MIU/L-ACNC: 2.44 UIU/ML (ref 0.27–4.2)
UROBILINOGEN, URINE: 0.2 E.U./DL
VITAMIN D 25-HYDROXY: 33 NG/ML (ref 30–100)
WBC # BLD: 9.8 E9/L (ref 4.5–11.5)
WBC UA: NORMAL /HPF (ref 0–5)

## 2021-12-07 PROCEDURE — 84156 ASSAY OF PROTEIN URINE: CPT

## 2021-12-07 PROCEDURE — 82746 ASSAY OF FOLIC ACID SERUM: CPT

## 2021-12-07 PROCEDURE — 83735 ASSAY OF MAGNESIUM: CPT

## 2021-12-07 PROCEDURE — 85025 COMPLETE CBC W/AUTO DIFF WBC: CPT

## 2021-12-07 PROCEDURE — 76821 MIDDLE CEREBRAL ARTERY ECHO: CPT | Performed by: OBSTETRICS & GYNECOLOGY

## 2021-12-07 PROCEDURE — G8427 DOCREV CUR MEDS BY ELIG CLIN: HCPCS | Performed by: OBSTETRICS & GYNECOLOGY

## 2021-12-07 PROCEDURE — 76816 OB US FOLLOW-UP PER FETUS: CPT | Performed by: OBSTETRICS & GYNECOLOGY

## 2021-12-07 PROCEDURE — 82607 VITAMIN B-12: CPT

## 2021-12-07 PROCEDURE — 84443 ASSAY THYROID STIM HORMONE: CPT

## 2021-12-07 PROCEDURE — 84445 ASSAY OF TSI GLOBULIN: CPT

## 2021-12-07 PROCEDURE — 87088 URINE BACTERIA CULTURE: CPT

## 2021-12-07 PROCEDURE — 1036F TOBACCO NON-USER: CPT | Performed by: OBSTETRICS & GYNECOLOGY

## 2021-12-07 PROCEDURE — 81002 URINALYSIS NONAUTO W/O SCOPE: CPT | Performed by: OBSTETRICS & GYNECOLOGY

## 2021-12-07 PROCEDURE — 82728 ASSAY OF FERRITIN: CPT

## 2021-12-07 PROCEDURE — 81001 URINALYSIS AUTO W/SCOPE: CPT

## 2021-12-07 PROCEDURE — 99214 OFFICE O/P EST MOD 30 MIN: CPT | Performed by: OBSTETRICS & GYNECOLOGY

## 2021-12-07 PROCEDURE — 99213 OFFICE O/P EST LOW 20 MIN: CPT | Performed by: OBSTETRICS & GYNECOLOGY

## 2021-12-07 PROCEDURE — 80053 COMPREHEN METABOLIC PANEL: CPT

## 2021-12-07 PROCEDURE — 76818 FETAL BIOPHYS PROFILE W/NST: CPT | Performed by: OBSTETRICS & GYNECOLOGY

## 2021-12-07 PROCEDURE — 84439 ASSAY OF FREE THYROXINE: CPT

## 2021-12-07 PROCEDURE — G8484 FLU IMMUNIZE NO ADMIN: HCPCS | Performed by: OBSTETRICS & GYNECOLOGY

## 2021-12-07 PROCEDURE — 86376 MICROSOMAL ANTIBODY EACH: CPT

## 2021-12-07 PROCEDURE — 76820 UMBILICAL ARTERY ECHO: CPT | Performed by: OBSTETRICS & GYNECOLOGY

## 2021-12-07 PROCEDURE — 82306 VITAMIN D 25 HYDROXY: CPT

## 2021-12-07 PROCEDURE — 83520 IMMUNOASSAY QUANT NOS NONAB: CPT

## 2021-12-07 PROCEDURE — 86800 THYROGLOBULIN ANTIBODY: CPT

## 2021-12-07 PROCEDURE — 36415 COLL VENOUS BLD VENIPUNCTURE: CPT

## 2021-12-07 PROCEDURE — 84481 FREE ASSAY (FT-3): CPT

## 2021-12-07 PROCEDURE — G8419 CALC BMI OUT NRM PARAM NOF/U: HCPCS | Performed by: OBSTETRICS & GYNECOLOGY

## 2021-12-07 PROCEDURE — 82570 ASSAY OF URINE CREATININE: CPT

## 2021-12-07 PROCEDURE — 83036 HEMOGLOBIN GLYCOSYLATED A1C: CPT

## 2021-12-07 NOTE — PROGRESS NOTES
fraction was 13.3 %. Results were low risk for aneuploidy. The results were reviewed with the patient today. The limitations of testing were also discussed. The patient did not have any additional questions or concerns. Again, recent studies have reported that there may be an increased risk for fetal loss in the setting of advanced maternal age. Thus, increased surveillance is recommended. I recommend monitoring fetal growth every 3 to 4 weeks starting at 24 to 26 weeks gestation. She should monitor fetal kick counts daily at 28 weeks' gestation. Instructions were reviewed. Increased fetal testing is recommended in the third trimester. She should be scheduled for testing twice weekly week starting at 32 to 34 weeks gestation. I  recommend delivery at 39 weeks' gestation. Given there is an increased risk for hypertensive disorders of pregnancy in the setting of advanced maternal age, the possible utility of a low dose aspirin in reducing her risk for hypertensive disorders of pregnancy was reviewed. The risks and benefits of low dose aspirin were discussed. She was counseled that she could take 81 mg of aspirin, daily. 2. Dizziness/heart palpitations -- The patient had complaints of having dizzy spells, tachycardia, and heart palpitations 3-4 times per day. She denied having any associated chest pain, shortness of breath, and or syncope. Her oxygen saturation was 98% on room air and her heart rate was 87 bpm.      Secondary to the patient's complaints, additional evaluation was recommended with an EKG, echocardiogram, 48 hour Holter monitor, and evaluation by cardiology. A referral and orders were provided. Additional labs were ordered including a nutrition panel and thyroid function studies.     Precautions were reviewed with the patient and she was counseled to present to the hospital with persistent/worsening symptoms or the development of associated chest pain, lightheadedness, dizziness, and/or syncope. 3.  Gestational diabetes -- The patient was previously seen and counseled regarding the implications and management of gestational diabetes in pregnancy. She is being managed locally. The patient is diet controlled. She had a blood sugar log available for review. Dates reviewed included 12/3 through . Her fasting values have ranged from 80-86 with 0 of 5 values elevated. Her post-breakfast values have ranged from  with 0 of 4 values elevated. Her post lunch values have ranged from  with 0 of 4 values elevated. Her post-dinner values have ranged from 104-116 with 0 of 4 values elevated. At this time, the patient was counseled to continue checking her finger 4 times daily, fasting and two hours postprandial. She was counseled to continue following a diabetic diet. She was instructed to contact our office next week with her values. Goals for glycemic control were again reviewed including a fasting value of 60-95 mg/dL and a two hour postprandial value of less than 120 mg/dL. She expressed verbal understanding of this counseling. Increased fetal surveillance is recommended. She should monitor fetal kick counts daily. Instructions were reviewed. She should also be scheduled for twice weekly fetal testing. Your office was contacted with these recommendations. A biophysical profile was done and scored 8/8. Fetal growth was assessed today and noted to be appropriate for the gestational age. She was scheduled for follow-up fetal growth assessment in 1 week. Delivery is recommended at 39 weeks' gestation given the need for medical therapy. During labor, her blood sugars should be monitored closely and ideally be less than 105 mg/dL during labor in order to minimize the risk of  hypoglycemia. She should also have a fasting sugar checked postpartum. She was again counseled that she has a 50% risk for the development of type 2 diabetes in the next 10 years.   This risk can be modified with diet and lifestyle changes. She will need a 2 hour oral glucose tolerance test at 8-12 weeks postpartum. If this is normal, she will need yearly screening for diabetes. If she becomes pregnant in the future, she is at increased risk for recurrent gestational diabetes and should have early screening in the late first or early second trimester. 4. History of COVID-19 infection -- The patient's pregnancy was complicated by a [id number] infection in 2021. Counseling was reviewed with the patient. She may be at increased risk for  morbidity and mortality including, but not limited to  labor and delivery and stillbirth. There is still limited information regarding the effect of COVID-19 in pregnancy, with available studies sometimes demonstrating conflicting results. It is known that high fevers during pregnancy have been associated with an increased risk for fetal neural tube defects, cleft lip with or without cleft palate, colonic atresia/stenosis, renal agenesis/hypoplasia, limb reduction defect, and gastroschisis. Women who developed COVID-19 during pregnancy are more likely than nonpregnant woman with COVID-19 to need care in intensive care units, to need ventilatory support, and to die from the illness. The risk of overall severe illness and death from pregnant women remains low. Pregnant women with additional health concerns such as obesity, hypertension and gestational diabetes may have a higher risk for severe illness, as seen with nonpregnant women with these compounding variables. Pregnant women of color have a higher rate of illness and death from COVID-19 compared to other pregnant women. This appears to be more related to social, health and economic inequities that put them at greater risk for illness. COVID-19 may be passed to the fetus during pregnancy but this seems to be uncommon as of the current available information.   Based on what is known about vaccine for COVID-19 in pregnancy, experts believe that the vaccine appears to be safe and should be offered to pregnant women, particularly those in at risk groups such as healthcare workers and other essential workers. Information regarding the safety of COVID-19 vaccination in pregnancy has been limited thus far, but, of the available reports, the vaccination has been deemed safe and is recommended for pregnant and breast-feeding women. .    She should have increase her fetal surveillance. Fetal growth to be monitored serially, every 3 to 4 weeks beginning at 24 to 26 weeks gestation. She should monitor fetal kick counts daily starting at 28 weeks gestation. She should be scheduled for fetal testing twice weekly starting at 32 weeks gestation. Delivery should be considered at 39 weeks gestation. A daily low-dose aspirin is recommended, 81 mg. A prescription was provided today. The risks and benefits were reviewed. She should continue this for the remainder of pregnancy and 6 to 8 weeks postpartum. 5.  Hypothyroidism --  The patient has hypothyroidism. Her current Synthroid dose is 100 mcg daily. She denied any signs or symptoms of hypo or hyperthyroidism today. Counseling was previously provided. Counseling was reviewed today. She was counseled that overall, women with hypothyroidism do well in pregnancy. Poorly controlled hypothyroidism is associated with increased obstetric risks including early pregnancy loss, poor fetal growth, nutritional deficiencies, placental abruption, hypertensive disorders of pregnancy,  birth, postpartum hemorrhage, , fetal loss, and neurodevelopmental issues with offspring. Additionally, the various anti-thyroid antibodies are associated with obstetric complications. The TSH should be less than 2.5 in the first trimester and less than 3 in the second and third trimesters.    Generally, the TSH should be monitored every 4  weeks during Specifically, if placentation occurs adjacent to or overlying a fibroid. Submucosal and retroplacental fibroids with volumes more than 200 ml (corresponding to 7 cm in diameter) have the highest risk of abruption. Pain is another common complication of fibroid in pregnancy. It is especially high in fibroids greater than 5 cm in diameter. Patients with degenerating fibroids tend to have localized pain, mild leukocytosis, pyrexia and nausea. This may result from decreased perfusion in the setting of rapid growth leading to ischemia. Some studies noted increased incidence of malpresentation if the uterus has multiple fibroids. Large retroplacental fibroids that distort the uterine cavity may be associated with adverse pregnancy event including  fetal growth restriction and  labor. Overall, the risks associated with uterine fibroids in pregnancy include malpresentation, lower uterine segment obstruction, degeneration, poor fetal growth, ,  birth, and antepartum/postpartum bleeding. The size and appearance of the fibroid(s) should be reevaluated on follow-up ultrasound. Fetal growth should be monitored serially. Fetal growth was appropriate for the gestational age today. Precautions were reviewed with the patient. --I requested the patient return for a follow-up assessment in 1 weeks unless there is a clinical reason for her to return prior to that time. She is to call if she has any problems or questions prior to her next visit. Further evaluation and management will be dependent on her clinical presentation and the results of her testing. --The patient was advised to call if she has any increased vaginal discharge, vaginal bleeding, contractions, abdominal pain, back pain or any new significant symptomatology prior to her next visit. I advised her that these are signs and symptoms of cervical change and require follow-up assessment when they occur.   Preeclampsia precautions were also reviewed with the patient. --The patient is to continue to follow with you in your office for ongoing obstetric care. --The total time spent on today's visit was 30 minutes. This included preparation for the visit (i.e. reviewing prior external notes and test results), performance of a medically appropriate history and examination, counseling, orders for medications, tests or other procedures, and coordination of care. Greater than 50% of the time was spent face-to-face with the patient. This time is exclusive of procedures performed. I answered all of  the patient's questions to her satisfaction. I asked her to call if she had any additional questions prior to her next visit. --At the conclusion of the visit, the patient appeared to have a good understanding of the issues discussed. I answered all of her questions to her satisfaction. I asked her to call if she had any additional questions prior to her next visit. --Thank you for allowing me to participate in the care of this pleasant patient. Please don't hesitate to call me if you have any questions. Sincerely,      Caleb Stewart MD, [place] [address]  [phone]      *All or parts of this note may have been generated using a voice recognition program. There may be typo, grammar, or Word substitution errors that have escaped my review of this note.

## 2021-12-07 NOTE — LETTER
[address] Maternal Fetal Medicine  [address]  [facility] [address]  Phone: [phone]  Fax: [phone]           Tommie Fitch MD      2021    Patient: Lolita Salgado   MR Number: [id number]   YOB: 1980   Date of Visit: 2021       Dear Dr. Hayes Search: Thank you for referring Nhi Fournier to me for evaluation/treatment. Below are the relevant portions of my assessment and plan of care. If you have questions, please do not hesitate to call me. I look forward to following Bahman Ash along with you. Sincerely,        Tommie Fitch MD    CC providers:  DOUG Mehta [facility]  [id number] [place] [phone]  Via [facility] [address]   2021      oRnald Lewis, [address] [place] [address]  [address]     RE:  Shaquille Moraes  : 1980   AGE: 39 y.o. This report has been created using voice recognition software. It may contain errors which are inherent in voice recognition technology. Dear Dr. Freddy Mruray:      I had the pleasure of meeting with Ms. Eric Valverde for a return consultation. As you know, Ms. Eric Valverde  is a 39 y.o. Eudora Meals at 38w0d (LMP = [address]) who is being followed by our office for multiple medical issues. Today, Ms. Eric Valverde reports that she feels well. She notes good fetal movement and denies any symptoms of leaking of fluid, vaginal bleeding, and/or contractions. She had a fetal ultrasound that was notable for the following. There is a single intrauterine gestation in a cephalic presentation with a heart rate of 141 beats per minute. The placenta is anterior. The composite gestational age is 41w10d. The estimated fetal weight is at the 79th percentile. The amniotic fluid index is 11.3 cm. BPP 10/10. Umbilical artery Doppler studies are normal. MCA PI normal. The uterine fibroid is seen in the lower uterine segment measuring 4.4 x 5.6 x 4.3 cm.       PERTINENT PHYSICAL EXAMINATION:   /75   Pulse 87   Wt 171 lb (77.6 kg)   LMP 2021   SpO2 98%   BMI 30.29 kg/m²     Urine dipstick:   Negative for Glucose    Negative for Albumin      A fetal ultrasound assessment was performed today. A report is enclosed for your review. Assessment & Plan:  39 y.o.  at 38w0d (LMP = [address]) with:    1. Advanced maternal age [facility]) -- The patient is being followed by maternal-fetal medicine. Counseling was provided regarding the implications and management of advanced maternal age in pregnancy. Counseling was reviewed with the patient today. She did not have any additional questions or concerns. The patient completed genetic screening with NIPT. The results were reviewed. The fetal fraction was 13.3 %. Results were low risk for aneuploidy. The results were reviewed with the patient today. The limitations of testing were also discussed. The patient did not have any additional questions or concerns. Again, recent studies have reported that there may be an increased risk for fetal loss in the setting of advanced maternal age. Thus, increased surveillance is recommended. I recommend monitoring fetal growth every 3 to 4 weeks starting at 24 to 26 weeks gestation. She should monitor fetal kick counts daily at 28 weeks' gestation. Instructions were reviewed. Increased fetal testing is recommended in the third trimester. She should be scheduled for testing twice weekly week starting at 32 to 34 weeks gestation. I  recommend delivery at 39 weeks' gestation. Given there is an increased risk for hypertensive disorders of pregnancy in the setting of advanced maternal age, the possible utility of a low dose aspirin in reducing her risk for hypertensive disorders of pregnancy was reviewed. The risks and benefits of low dose aspirin were discussed. She was counseled that she could take 81 mg of aspirin, daily.       2. Dizziness/heart palpitations -- The patient had complaints of having dizzy spells, tachycardia, and heart palpitations 3-4 times per day. She denied having any associated chest pain, shortness of breath, and or syncope. Her oxygen saturation was 98% on room air and her heart rate was 87 bpm.      Secondary to the patient's complaints, additional evaluation was recommended with an EKG, echocardiogram, 48 hour Holter monitor, and evaluation by cardiology. A referral and orders were provided. Additional labs were ordered including a nutrition panel and thyroid function studies. Precautions were reviewed with the patient and she was counseled to present to the hospital with persistent/worsening symptoms or the development of associated chest pain, lightheadedness, dizziness, and/or syncope. 3.  Gestational diabetes -- The patient was previously seen and counseled regarding the implications and management of gestational diabetes in pregnancy. She is being managed locally. The patient is diet controlled. She had a blood sugar log available for review. Dates reviewed included 12/3 through 12/7. Her fasting values have ranged from 80-86 with 0 of 5 values elevated. Her post-breakfast values have ranged from  with 0 of 4 values elevated. Her post lunch values have ranged from  with 0 of 4 values elevated. Her post-dinner values have ranged from 104-116 with 0 of 4 values elevated. At this time, the patient was counseled to continue checking her finger 4 times daily, fasting and two hours postprandial. She was counseled to continue following a diabetic diet. She was instructed to contact our office next week with her values. Goals for glycemic control were again reviewed including a fasting value of 60-95 mg/dL and a two hour postprandial value of less than 120 mg/dL. She expressed verbal understanding of this counseling. Increased fetal surveillance is recommended. She should monitor fetal kick counts daily. Instructions were reviewed.  She should also be scheduled for twice weekly fetal testing. Your office was contacted with these recommendations. A biophysical profile was done and scored . Fetal growth was assessed today and noted to be appropriate for the gestational age. She was scheduled for follow-up fetal growth assessment in 1 week. Delivery is recommended at 39 weeks' gestation given the need for medical therapy. During labor, her blood sugars should be monitored closely and ideally be less than 105 mg/dL during labor in order to minimize the risk of  hypoglycemia. She should also have a fasting sugar checked postpartum. She was again counseled that she has a 50% risk for the development of type 2 diabetes in the next 10 years. This risk can be modified with diet and lifestyle changes. She will need a 2 hour oral glucose tolerance test at 8-12 weeks postpartum. If this is normal, she will need yearly screening for diabetes. If she becomes pregnant in the future, she is at increased risk for recurrent gestational diabetes and should have early screening in the late first or early second trimester. 4. History of COVID-19 infection -- The patient's pregnancy was complicated by a ERGHZ-25 infection in 2021. Counseling was reviewed with the patient. She may be at increased risk for  morbidity and mortality including, but not limited to  labor and delivery and stillbirth. There is still limited information regarding the effect of COVID-19 in pregnancy, with available studies sometimes demonstrating conflicting results. It is known that high fevers during pregnancy have been associated with an increased risk for fetal neural tube defects, cleft lip with or without cleft palate, colonic atresia/stenosis, renal agenesis/hypoplasia, limb reduction defect, and gastroschisis.   Women who developed COVID-19 during pregnancy are more likely than nonpregnant woman with COVID-19 to need care in intensive care units, to need ventilatory support, and to die from the illness. The risk of overall severe illness and death from pregnant women remains low. Pregnant women with additional health concerns such as obesity, hypertension and gestational diabetes may have a higher risk for severe illness, as seen with nonpregnant women with these compounding variables. Pregnant women of color have a higher rate of illness and death from COVID-19 compared to other pregnant women. This appears to be more related to social, health and economic inequities that put them at greater risk for illness. COVID-19 may be passed to the fetus during pregnancy but this seems to be uncommon as of the current available information. Based on what is known about vaccine for COVID-19 in pregnancy, experts believe that the vaccine appears to be safe and should be offered to pregnant women, particularly those in at risk groups such as healthcare workers and other essential workers. Information regarding the safety of COVID-19 vaccination in pregnancy has been limited thus far, but, of the available reports, the vaccination has been deemed safe and is recommended for pregnant and breast-feeding women. .    She should have increase her fetal surveillance. Fetal growth to be monitored serially, every 3 to 4 weeks beginning at 24 to 26 weeks gestation. She should monitor fetal kick counts daily starting at 28 weeks gestation. She should be scheduled for fetal testing twice weekly starting at 32 weeks gestation. Delivery should be considered at 39 weeks gestation. A daily low-dose aspirin is recommended, 81 mg. A prescription was provided today. The risks and benefits were reviewed. She should continue this for the remainder of pregnancy and 6 to 8 weeks postpartum. 5.  Hypothyroidism --  The patient has hypothyroidism. Her current Synthroid dose is 100 mcg daily. She denied any signs or symptoms of hypo or hyperthyroidism today. Counseling was previously provided. Counseling was reviewed today. She was counseled that overall, women with hypothyroidism do well in pregnancy. Poorly controlled hypothyroidism is associated with increased obstetric risks including early pregnancy loss, poor fetal growth, nutritional deficiencies, placental abruption, hypertensive disorders of pregnancy,  birth, postpartum hemorrhage, , fetal loss, and neurodevelopmental issues with offspring. Additionally, the various anti-thyroid antibodies are associated with obstetric complications. The TSH should be less than 2.5 in the first trimester and less than 3 in the second and third trimesters. Generally, the TSH should be monitored every 4  weeks during pregnancy. Repeat studies along with antibody studies were ordered today. She was also given orders for a nutrition panel given the increased risk for nutritional deficiencies in the setting of hypothyroidism. Again, fetal growth will be monitored serially. Fetal growth was appropriate for the gestational age today. 6.  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 Counseling was previously provided to the patient. Counseling was reviewed today. The patient did not have any additional questions or concerns. Again, this condition is an autosomal recessive disease. In order to have an affected child, typically, both parents are carriers for the condition. In this setting, the risk of having an affected child is approximately 25%. Thus, testing was recommended for the patient's partner. He declined genetic testing. The patient declined diagnostic testing. The patient indicated that she plans to have  testing after delivery. The patient was counseled to relay this family history to the pediatrician who cares for her child/children.      7.  Uterine fibroids -- The ultrasound gestational age today. Precautions were reviewed with the patient. --I requested the patient return for a follow-up assessment in 1 weeks unless there is a clinical reason for her to return prior to that time. She is to call if she has any problems or questions prior to her next visit. Further evaluation and management will be dependent on her clinical presentation and the results of her testing. --The patient was advised to call if she has any increased vaginal discharge, vaginal bleeding, contractions, abdominal pain, back pain or any new significant symptomatology prior to her next visit. I advised her that these are signs and symptoms of cervical change and require follow-up assessment when they occur. Preeclampsia precautions were also reviewed with the patient. --The patient is to continue to follow with you in your office for ongoing obstetric care. --The total time spent on today's visit was 30 minutes. This included preparation for the visit (i.e. reviewing prior external notes and test results), performance of a medically appropriate history and examination, counseling, orders for medications, tests or other procedures, and coordination of care. Greater than 50% of the time was spent face-to-face with the patient. This time is exclusive of procedures performed. I answered all of  the patient's questions to her satisfaction. I asked her to call if she had any additional questions prior to her next visit. --At the conclusion of the visit, the patient appeared to have a good understanding of the issues discussed. I answered all of her questions to her satisfaction. I asked her to call if she had any additional questions prior to her next visit. --Thank you for allowing me to participate in the care of this pleasant patient. Please don't hesitate to call me if you have any questions.       Sincerely,      Willi Santana MD, [facility]  [phone]      *All or parts of this note may have been generated using a voice recognition program. There may be typo, grammar, or Word substitution errors that have escaped my review of this note.

## 2021-12-07 NOTE — PATIENT INSTRUCTIONS
your skin. · You have other concerns about your pregnancy. If you have labor signs at 37 weeks or more  If you have signs of labor at 37 weeks or more, your doctor may tell you to call when your labor becomes more active. Symptoms of active labor include:  · Contractions that are regular. · Contractions that are less than 5 minutes apart. · Contractions that are hard to talk through. Follow-up care is a key part of your treatment and safety. Be sure to make and go to all appointments, and call your doctor if you are having problems. It's also a good idea to know your test results and keep a list of the medicines you take. Where can you learn more? Go to https://adsquarepeBocom.[employer]TEK. org and sign in to your [employer] account. Enter  in the [employer] box to learn more about \"Learning About When to Call Your Doctor During Pregnancy (After 20 Weeks). \"     If you do not have an account, please click on the \"Sign Up Now\" link. Current as of: June 16, 2021               Content Version: 13.0  © 9290-2486 Healthwise, Incorporated. Care instructions adapted under license by [facility] [address] If you have questions about a medical condition or this instruction, always ask your healthcare professional. [place] [address] any warranty or liability for your use of this information.

## 2021-12-08 LAB — T3 FREE: 2.1 PG/ML (ref 2–4.4)

## 2021-12-09 LAB
FOLATE: 16.8 NG/ML (ref 4.8–24.2)
URINE CULTURE, ROUTINE: NORMAL
VITAMIN B-12: 204 PG/ML (ref 211–946)

## 2021-12-10 ENCOUNTER — OFFICE VISIT (OUTPATIENT)
Dept: CARDIOLOGY CLINIC | Age: 41
End: 2021-12-10
Payer: COMMERCIAL

## 2021-12-10 VITALS
WEIGHT: 173.6 LBS | BODY MASS INDEX: 30.76 KG/M2 | DIASTOLIC BLOOD PRESSURE: 60 MMHG | OXYGEN SATURATION: 97 % | HEIGHT: 63 IN | SYSTOLIC BLOOD PRESSURE: 122 MMHG | HEART RATE: 102 BPM | RESPIRATION RATE: 20 BRPM

## 2021-12-10 DIAGNOSIS — R00.2 PALPITATIONS: Primary | ICD-10-CM

## 2021-12-10 LAB
THYROGLOBULIN ANTIBODY: 14 IU/ML (ref 0–40)
THYROID PEROXIDASE (TPO) ABS: 25 IU/ML (ref 0–25)

## 2021-12-10 PROCEDURE — 93000 ELECTROCARDIOGRAM COMPLETE: CPT | Performed by: INTERNAL MEDICINE

## 2021-12-10 PROCEDURE — 1036F TOBACCO NON-USER: CPT | Performed by: INTERNAL MEDICINE

## 2021-12-10 PROCEDURE — G8427 DOCREV CUR MEDS BY ELIG CLIN: HCPCS | Performed by: INTERNAL MEDICINE

## 2021-12-10 PROCEDURE — G8419 CALC BMI OUT NRM PARAM NOF/U: HCPCS | Performed by: INTERNAL MEDICINE

## 2021-12-10 PROCEDURE — G8484 FLU IMMUNIZE NO ADMIN: HCPCS | Performed by: INTERNAL MEDICINE

## 2021-12-10 PROCEDURE — 99202 OFFICE O/P NEW SF 15 MIN: CPT | Performed by: INTERNAL MEDICINE

## 2021-12-10 RX ORDER — CALCIUM CARBONATE 200(500)MG
1 TABLET,CHEWABLE ORAL PRN
Status: ON HOLD | COMMUNITY
End: 2021-12-17 | Stop reason: HOSPADM

## 2021-12-10 ASSESSMENT — ENCOUNTER SYMPTOMS
CONSTIPATION: 0
WHEEZING: 0
BLOOD IN STOOL: 0
DIARRHEA: 0
SHORTNESS OF BREATH: 0
NAUSEA: 0
VOMITING: 0
ABDOMINAL PAIN: 0
COUGH: 0
CHEST TIGHTNESS: 1
BACK PAIN: 0

## 2021-12-10 NOTE — PROGRESS NOTES
Psychiatric/Behavioral: The patient is not nervous/anxious. Past Medical History:  Past Medical History:   Diagnosis Date    Abnormal Pap smear of cervix     Chronic back pain     Dazed state 2017    Diabetes mellitus ([address]     GDM     Dizziness 2017    Endometriosis     Fibromyalgia     Headache     Hypothyroidism 2017    Impaired cognition 2017    Lightheaded 2017    Migraine 2017    1 to 2 per month, 7/10 on the pain scale    Neck pain 2017    Painful sensitiveness to sound 2017    Shoulder pain, bilateral 2017       Past Surgical History:  Past Surgical History:   Procedure Laterality Date    LAPAROSCOPY  2009    LEEP Bilateral 2006    MOUTH SURGERY  1990    TONGUE TIE     TONSILLECTOMY Bilateral 1989    WISDOM TOOTH EXTRACTION  2005       Family History:  Family History   Problem Relation Age of Onset    No Known Problems Mother     Brain Cancer Father         Glioblastoma       Social History:  Social History     Socioeconomic History    Marital status:      Spouse name: Not on file    Number of children: Not on file    Years of education: Not on file    Highest education level: Not on file   Occupational History    Not on file   Tobacco Use    Smoking status: Never Smoker    Smokeless tobacco: Never Used   Vaping Use    Vaping Use: Never used   Substance and Sexual Activity    Alcohol use: No    Drug use: No    Sexual activity: Yes     Partners: Male   Other Topics Concern    Not on file   Social History Narrative    Not on file     Social Determinants of Health     Financial Resource Strain:     Difficulty of Paying Living Expenses: Not on file   Food Insecurity:     Worried About Running Out of Food in the Last Year: Not on file    Teodora of Food in the Last Year: Not on file   Transportation Needs:     Lack of Transportation (Medical): Not on file    Lack of Transportation (Non-Medical):  Not on file   Physical Activity:     Days of Exercise per Week: Not on file    Minutes of Exercise per Session: Not on file   Stress:     Feeling of Stress : Not on file   Social Connections:     Frequency of Communication with Friends and Family: Not on file    Frequency of Social Gatherings with Friends and Family: Not on file    Attends [employer] Services: Not on file    Active Member of [address] [place] [address] or Organizations: Not on file    Attends Club or Organization Meetings: Not on file    Marital Status: Not on file   Intimate Partner Violence:     Fear of Current or Ex-Partner: Not on file    Emotionally Abused: Not on file    Physically Abused: Not on file    Sexually Abused: Not on file   Housing Stability:     Unable to Pay for Housing in the Last Year: Not on file    Number of [place] in the Last Year: Not on file    Unstable Housing in the Last Year: Not on file       Allergies:  No Known Allergies    Current Medications:  Current Outpatient Medications   Medication Sig Dispense Refill    valACYclovir (VALTREX) 500 MG tablet Take 500 mg by mouth daily      Prenatal MV-Min-Fe Fum-FA-DHA (PRENATAL 1 PO) Take by mouth      aspirin (ASPIRIN CHILDRENS) 81 MG chewable tablet Take 1 tablet by mouth daily 30 tablet 3    levothyroxine (SYNTHROID) 25 MCG tablet Take 100 mcg by mouth Daily        No current facility-administered medications for this visit. Physical Exam:  LMP 03/16/2021   Wt Readings from Last 3 Encounters:   12/07/21 171 lb (77.6 kg)   11/30/21 174 lb (78.9 kg)   11/23/21 172 lb 2 oz (78.1 kg)     Physical Exam:  LMP 03/16/2021   Wt Readings from Last 3 Encounters:   12/07/21 171 lb (77.6 kg)   11/30/21 174 lb (78.9 kg)   11/23/21 172 lb 2 oz (78.1 kg)     Physical Exam  Constitutional:       General: She is not in acute distress. Appearance: She is well-developed. HENT:      Head: Normocephalic and atraumatic. Neck:      Vascular: No carotid bruit or JVD. Cardiovascular:      Rate and Rhythm: Regular rhythm.  Tachycardia present. Heart sounds: No murmur heard. No friction rub. No gallop. Pulmonary:      Breath sounds: Normal breath sounds. No wheezing or rales. Chest:      Chest wall: No tenderness. Abdominal:      General: Bowel sounds are normal. There is no distension. Palpations: Abdomen is soft. There is no mass. Tenderness: There is no abdominal tenderness. Musculoskeletal:      Cervical back: Neck supple. Right lower leg: No edema. Left lower leg: No edema. Skin:     General: Skin is warm and dry. Neurological:      Mental Status: She is alert and oriented to person, place, and time. Laboratory Tests:  Lab Results   Component Value Date    CREATININE 0.9 12/07/2021    BUN 11 12/07/2021     12/07/2021    K 4.2 12/07/2021     12/07/2021    CO2 21 (L) 12/07/2021     Lab Results   Component Value Date    MG 2.1 12/07/2021     Lab Results   Component Value Date    WBC 9.8 12/07/2021    HGB 13.8 12/07/2021    HCT 40.7 12/07/2021    MCV 92.1 12/07/2021     12/07/2021     Lab Results   Component Value Date    ALT 11 12/07/2021    AST 14 12/07/2021    ALKPHOS 136 (H) 12/07/2021    BILITOT <0.2 12/07/2021     Lab Results   Component Value Date    CKTOTAL 358 (H) 11/17/2017    CKMB 5.6 (H) 11/17/2017    TROPONINI <0.01 11/17/2017    TROPONINI <0.01 03/09/2017     Lab Results   Component Value Date    INR 1.0 11/17/2017    INR 1.1 11/14/2012    PROTIME 11.0 11/17/2017    PROTIME 11.5 11/14/2012     Lab Results   Component Value Date    TSH 2.440 12/07/2021     Lab Results   Component Value Date    LABA1C 5.2 12/07/2021       ASSESSMENT / PLAN:  -Palpitations: Probably due to sinus tachycardia. The tachycardia is probably due to pregnancy, aching due to fibromyalgia and probably anxiety.  -History of vertebrobasilar TIA.  -History of gestational diabetes. -Hypothyroidism. Her last TSH is normal.  -Fibromyalgia.   -Status post COVID-19 infection.  -Canavan disease carrier.  -Depression. Patient was reassured that her tachycardia is probably due to her pregnancy, pain and anxiety. Patient would be seen at the office on a as needed basis. If patient continues to have palpitations after delivering her baby, then would arrange for the patient to have an echocardiogram and a 14-days ZIO XT monitor.       Jer Noguera MD [address] Cardiology

## 2021-12-11 LAB — THYROID STIMULATING IMMUNOGLOBULIN: <0.1 IU/L

## 2021-12-12 LAB — TSH RECEPTOR AB: <0.9 IU/L

## 2021-12-14 ENCOUNTER — APPOINTMENT (OUTPATIENT)
Dept: LABOR AND DELIVERY | Age: 41
DRG: 560 | End: 2021-12-14
Payer: COMMERCIAL

## 2021-12-14 ENCOUNTER — HOSPITAL ENCOUNTER (INPATIENT)
Age: 41
LOS: 3 days | Discharge: HOME | DRG: 560 | End: 2021-12-17
Attending: OBSTETRICS & GYNECOLOGY | Admitting: OBSTETRICS & GYNECOLOGY
Payer: COMMERCIAL

## 2021-12-14 LAB
ABO/RH: NORMAL
ALBUMIN SERPL-MCNC: 3.2 G/DL (ref 3.5–5.2)
ALP BLD-CCNC: 145 U/L (ref 35–104)
ALT SERPL-CCNC: 9 U/L (ref 0–32)
AMPHETAMINE SCREEN, URINE: NOT DETECTED
ANION GAP SERPL CALCULATED.3IONS-SCNC: 13 MMOL/L (ref 7–16)
ANTIBODY SCREEN: NORMAL
AST SERPL-CCNC: 17 U/L (ref 0–31)
BARBITURATE SCREEN URINE: NOT DETECTED
BENZODIAZEPINE SCREEN, URINE: NOT DETECTED
BILIRUB SERPL-MCNC: <0.2 MG/DL (ref 0–1.2)
BUN BLDV-MCNC: 14 MG/DL (ref 6–20)
CALCIUM SERPL-MCNC: 8.8 MG/DL (ref 8.6–10.2)
CANNABINOID SCREEN URINE: NOT DETECTED
CHLORIDE BLD-SCNC: 101 MMOL/L (ref 98–107)
CO2: 17 MMOL/L (ref 22–29)
COCAINE METABOLITE SCREEN URINE: NOT DETECTED
CREAT SERPL-MCNC: 0.8 MG/DL (ref 0.5–1)
FENTANYL SCREEN, URINE: NOT DETECTED
GFR AFRICAN AMERICAN: >60
GFR NON-AFRICAN AMERICAN: >60 ML/MIN/1.73
GLUCOSE BLD-MCNC: 104 MG/DL (ref 74–99)
HCT VFR BLD CALC: 40.7 % (ref 34–48)
HEMOGLOBIN: 13.7 G/DL (ref 11.5–15.5)
Lab: NORMAL
MCH RBC QN AUTO: 30.6 PG (ref 26–35)
MCHC RBC AUTO-ENTMCNC: 33.7 % (ref 32–34.5)
MCV RBC AUTO: 91.1 FL (ref 80–99.9)
METHADONE SCREEN, URINE: NOT DETECTED
OPIATE SCREEN URINE: NOT DETECTED
OXYCODONE URINE: NOT DETECTED
PDW BLD-RTO: 13.3 FL (ref 11.5–15)
PHENCYCLIDINE SCREEN URINE: NOT DETECTED
PLATELET # BLD: 162 E9/L (ref 130–450)
PMV BLD AUTO: 12 FL (ref 7–12)
POTASSIUM SERPL-SCNC: 3.6 MMOL/L (ref 3.5–5)
RBC # BLD: 4.47 E12/L (ref 3.5–5.5)
SODIUM BLD-SCNC: 131 MMOL/L (ref 132–146)
TOTAL PROTEIN: 6.2 G/DL (ref 6.4–8.3)
TSH SERPL DL<=0.05 MIU/L-ACNC: 1.87 UIU/ML (ref 0.27–4.2)
WBC # BLD: 8.8 E9/L (ref 4.5–11.5)

## 2021-12-14 PROCEDURE — 84443 ASSAY THYROID STIM HORMONE: CPT

## 2021-12-14 PROCEDURE — 2580000003 HC RX 258: Performed by: ADVANCED PRACTICE MIDWIFE

## 2021-12-14 PROCEDURE — 36415 COLL VENOUS BLD VENIPUNCTURE: CPT

## 2021-12-14 PROCEDURE — 1220000001 HC SEMI PRIVATE L&D R&B

## 2021-12-14 PROCEDURE — 85027 COMPLETE CBC AUTOMATED: CPT

## 2021-12-14 PROCEDURE — 6360000002 HC RX W HCPCS: Performed by: ADVANCED PRACTICE MIDWIFE

## 2021-12-14 PROCEDURE — 86900 BLOOD TYPING SEROLOGIC ABO: CPT

## 2021-12-14 PROCEDURE — 6370000000 HC RX 637 (ALT 250 FOR IP): Performed by: ADVANCED PRACTICE MIDWIFE

## 2021-12-14 PROCEDURE — 80307 DRUG TEST PRSMV CHEM ANLYZR: CPT

## 2021-12-14 PROCEDURE — 80053 COMPREHEN METABOLIC PANEL: CPT

## 2021-12-14 PROCEDURE — 83036 HEMOGLOBIN GLYCOSYLATED A1C: CPT

## 2021-12-14 PROCEDURE — 86850 RBC ANTIBODY SCREEN: CPT

## 2021-12-14 PROCEDURE — 86901 BLOOD TYPING SEROLOGIC RH(D): CPT

## 2021-12-14 RX ORDER — SODIUM CHLORIDE 9 MG/ML
25 INJECTION, SOLUTION INTRAVENOUS PRN
Status: DISCONTINUED | OUTPATIENT
Start: 2021-12-14 | End: 2021-12-15

## 2021-12-14 RX ORDER — SODIUM CHLORIDE, SODIUM LACTATE, POTASSIUM CHLORIDE, AND CALCIUM CHLORIDE .6; .31; .03; .02 G/100ML; G/100ML; G/100ML; G/100ML
1000 INJECTION, SOLUTION INTRAVENOUS PRN
Status: DISCONTINUED | OUTPATIENT
Start: 2021-12-14 | End: 2021-12-15

## 2021-12-14 RX ORDER — ONDANSETRON 2 MG/ML
4 INJECTION INTRAMUSCULAR; INTRAVENOUS EVERY 6 HOURS PRN
Status: DISCONTINUED | OUTPATIENT
Start: 2021-12-14 | End: 2021-12-15

## 2021-12-14 RX ORDER — SODIUM CHLORIDE 0.9 % (FLUSH) 0.9 %
5-40 SYRINGE (ML) INJECTION EVERY 12 HOURS SCHEDULED
Status: DISCONTINUED | OUTPATIENT
Start: 2021-12-14 | End: 2021-12-15

## 2021-12-14 RX ORDER — SODIUM CHLORIDE, SODIUM LACTATE, POTASSIUM CHLORIDE, CALCIUM CHLORIDE 600; 310; 30; 20 MG/100ML; MG/100ML; MG/100ML; MG/100ML
INJECTION, SOLUTION INTRAVENOUS CONTINUOUS
Status: DISCONTINUED | OUTPATIENT
Start: 2021-12-14 | End: 2021-12-15

## 2021-12-14 RX ORDER — SODIUM CHLORIDE 0.9 % (FLUSH) 0.9 %
5-40 SYRINGE (ML) INJECTION PRN
Status: DISCONTINUED | OUTPATIENT
Start: 2021-12-14 | End: 2021-12-15

## 2021-12-14 RX ORDER — SODIUM CHLORIDE, SODIUM LACTATE, POTASSIUM CHLORIDE, AND CALCIUM CHLORIDE .6; .31; .03; .02 G/100ML; G/100ML; G/100ML; G/100ML
500 INJECTION, SOLUTION INTRAVENOUS PRN
Status: DISCONTINUED | OUTPATIENT
Start: 2021-12-14 | End: 2021-12-15

## 2021-12-14 RX ORDER — PENICILLIN G 3000000 [IU]/50ML
3 INJECTION, SOLUTION INTRAVENOUS EVERY 4 HOURS
Status: DISCONTINUED | OUTPATIENT
Start: 2021-12-15 | End: 2021-12-15

## 2021-12-14 RX ADMIN — SODIUM CHLORIDE, POTASSIUM CHLORIDE, SODIUM LACTATE AND CALCIUM CHLORIDE 500 ML: 600; 310; 30; 20 INJECTION, SOLUTION INTRAVENOUS at 22:31

## 2021-12-14 RX ADMIN — SODIUM CHLORIDE, PRESERVATIVE FREE 10 ML: 5 INJECTION INTRAVENOUS at 22:56

## 2021-12-14 RX ADMIN — Medication 25 MCG: at 22:30

## 2021-12-14 RX ADMIN — PENICILLIN G POTASSIUM 5 MILLION UNITS: 5000000 INJECTION, POWDER, FOR SOLUTION INTRAMUSCULAR; INTRAVENOUS at 22:30

## 2021-12-15 PROBLEM — E03.9 HYPOTHYROIDISM AFFECTING PREGNANCY IN THIRD TRIMESTER: Status: ACTIVE | Noted: 2021-12-15

## 2021-12-15 PROBLEM — O24.419 GESTATIONAL DIABETES MELLITUS (GDM): Status: ACTIVE | Noted: 2021-12-15

## 2021-12-15 PROBLEM — G45.0 VERTEBROBASILAR TIAS: Status: RESOLVED | Noted: 2017-05-17 | Resolved: 2021-12-15

## 2021-12-15 PROBLEM — O99.283 HYPOTHYROIDISM AFFECTING PREGNANCY IN THIRD TRIMESTER: Status: ACTIVE | Noted: 2021-12-15

## 2021-12-15 PROBLEM — Z3A.39 39 WEEKS GESTATION OF PREGNANCY: Status: ACTIVE | Noted: 2021-12-15

## 2021-12-15 PROBLEM — Z86.19 HISTORY OF HERPES GENITALIS: Status: ACTIVE | Noted: 2021-12-15

## 2021-12-15 PROBLEM — Z86.16 PERSONAL HISTORY OF COVID-19: Status: ACTIVE | Noted: 2021-12-15

## 2021-12-15 PROBLEM — R42 LIGHTHEADED: Status: RESOLVED | Noted: 2017-01-01 | Resolved: 2021-12-15

## 2021-12-15 PROBLEM — O09.529 ADVANCED MATERNAL AGE IN MULTIGRAVIDA: Status: ACTIVE | Noted: 2021-12-15

## 2021-12-15 PROBLEM — M79.89 LEG SWELLING: Status: RESOLVED | Noted: 2017-11-17 | Resolved: 2021-12-15

## 2021-12-15 PROBLEM — M79.7 FIBROMYALGIA: Status: RESOLVED | Noted: 2017-05-17 | Resolved: 2021-12-15

## 2021-12-15 PROBLEM — F32.A DEPRESSION: Status: RESOLVED | Noted: 2017-05-17 | Resolved: 2021-12-15

## 2021-12-15 LAB — HBA1C MFR BLD: 5.3 % (ref 4–5.6)

## 2021-12-15 PROCEDURE — 6370000000 HC RX 637 (ALT 250 FOR IP): Performed by: ADVANCED PRACTICE MIDWIFE

## 2021-12-15 PROCEDURE — 7200000001 HC VAGINAL DELIVERY

## 2021-12-15 PROCEDURE — 2500000003 HC RX 250 WO HCPCS

## 2021-12-15 PROCEDURE — 0HQ9XZZ REPAIR PERINEUM SKIN, EXTERNAL APPROACH: ICD-10-PCS | Performed by: OBSTETRICS & GYNECOLOGY

## 2021-12-15 PROCEDURE — 1220000000 HC SEMI PRIVATE OB R&B

## 2021-12-15 PROCEDURE — 6360000002 HC RX W HCPCS: Performed by: ADVANCED PRACTICE MIDWIFE

## 2021-12-15 PROCEDURE — 6370000000 HC RX 637 (ALT 250 FOR IP)

## 2021-12-15 RX ORDER — IBUPROFEN 800 MG/1
800 TABLET ORAL EVERY 8 HOURS PRN
Status: DISCONTINUED | OUTPATIENT
Start: 2021-12-15 | End: 2021-12-17 | Stop reason: HOSPADM

## 2021-12-15 RX ORDER — ACETAMINOPHEN 325 MG/1
650 TABLET ORAL EVERY 4 HOURS PRN
Status: DISCONTINUED | OUTPATIENT
Start: 2021-12-15 | End: 2021-12-17 | Stop reason: HOSPADM

## 2021-12-15 RX ORDER — LIDOCAINE HYDROCHLORIDE 20 MG/ML
JELLY TOPICAL
Status: COMPLETED
Start: 2021-12-15 | End: 2021-12-15

## 2021-12-15 RX ORDER — VALACYCLOVIR HYDROCHLORIDE 500 MG/1
500 TABLET, FILM COATED ORAL DAILY
Status: DISCONTINUED | OUTPATIENT
Start: 2021-12-15 | End: 2021-12-17 | Stop reason: HOSPADM

## 2021-12-15 RX ORDER — LIDOCAINE HYDROCHLORIDE 10 MG/ML
INJECTION, SOLUTION INFILTRATION; PERINEURAL
Status: COMPLETED
Start: 2021-12-15 | End: 2021-12-15

## 2021-12-15 RX ORDER — LEVOTHYROXINE SODIUM 0.1 MG/1
100 TABLET ORAL DAILY
Status: DISCONTINUED | OUTPATIENT
Start: 2021-12-15 | End: 2021-12-17 | Stop reason: HOSPADM

## 2021-12-15 RX ORDER — PRENATAL WITH FERROUS FUM AND FOLIC ACID 3080; 920; 120; 400; 22; 1.84; 3; 20; 10; 1; 12; 200; 27; 25; 2 [IU]/1; [IU]/1; MG/1; [IU]/1; MG/1; MG/1; MG/1; MG/1; MG/1; MG/1; UG/1; MG/1; MG/1; MG/1; MG/1
1 TABLET ORAL DAILY
Status: DISCONTINUED | OUTPATIENT
Start: 2021-12-15 | End: 2021-12-17 | Stop reason: HOSPADM

## 2021-12-15 RX ORDER — FERROUS SULFATE 325(65) MG
325 TABLET ORAL 2 TIMES DAILY WITH MEALS
Status: DISCONTINUED | OUTPATIENT
Start: 2021-12-15 | End: 2021-12-17 | Stop reason: HOSPADM

## 2021-12-15 RX ORDER — DOCUSATE SODIUM 100 MG/1
100 CAPSULE, LIQUID FILLED ORAL 2 TIMES DAILY
Status: DISCONTINUED | OUTPATIENT
Start: 2021-12-15 | End: 2021-12-17 | Stop reason: HOSPADM

## 2021-12-15 RX ORDER — SODIUM CHLORIDE, SODIUM LACTATE, POTASSIUM CHLORIDE, CALCIUM CHLORIDE 600; 310; 30; 20 MG/100ML; MG/100ML; MG/100ML; MG/100ML
INJECTION, SOLUTION INTRAVENOUS CONTINUOUS
Status: DISCONTINUED | OUTPATIENT
Start: 2021-12-15 | End: 2021-12-17 | Stop reason: HOSPADM

## 2021-12-15 RX ADMIN — ACETAMINOPHEN 650 MG: 325 TABLET ORAL at 21:47

## 2021-12-15 RX ADMIN — LIDOCAINE HYDROCHLORIDE: 20 JELLY TOPICAL at 08:27

## 2021-12-15 RX ADMIN — DOCUSATE SODIUM 100 MG: 100 CAPSULE ORAL at 21:32

## 2021-12-15 RX ADMIN — IBUPROFEN 800 MG: 800 TABLET, FILM COATED ORAL at 10:34

## 2021-12-15 RX ADMIN — METFORMIN HYDROCHLORIDE 1 TABLET: 500 TABLET, EXTENDED RELEASE ORAL at 12:21

## 2021-12-15 RX ADMIN — Medication 50 MCG: at 03:08

## 2021-12-15 RX ADMIN — PENICILLIN G 3 MILLION UNITS: 3000000 INJECTION, SOLUTION INTRAVENOUS at 02:30

## 2021-12-15 RX ADMIN — DOCUSATE SODIUM 100 MG: 100 CAPSULE ORAL at 12:21

## 2021-12-15 RX ADMIN — LIDOCAINE HYDROCHLORIDE 200 MG: 10 INJECTION, SOLUTION INFILTRATION; PERINEURAL at 09:43

## 2021-12-15 RX ADMIN — LEVOTHYROXINE SODIUM 100 MCG: 100 TABLET ORAL at 14:03

## 2021-12-15 RX ADMIN — FERROUS SULFATE TAB 325 MG (65 MG ELEMENTAL FE) 325 MG: 325 (65 FE) TAB at 12:22

## 2021-12-15 RX ADMIN — PENICILLIN G 3 MILLION UNITS: 3000000 INJECTION, SOLUTION INTRAVENOUS at 06:30

## 2021-12-15 ASSESSMENT — PAIN SCALES - GENERAL
PAINLEVEL_OUTOF10: 0
PAINLEVEL_OUTOF10: 4
PAINLEVEL_OUTOF10: 4

## 2021-12-15 NOTE — H&P
Department of Obstetrics and Gynecology  Nurse Practitioner Obstetrics History and Physical        CHIEF COMPLAINT:  Induction of labor at 44 weeks    HISTORY OF PRESENT ILLNESS:      The patient is a 39 y.o.  2 parity 1 at 43 weeks. Patient presents with a chief complaint as above and is being admitted for induction    DATES:    Last Menstrual Period:  3/16/2021  Estimated Due Date:  21     PRENATAL CARE:    Provider:  Anjelica Gu. Miriam Garcia    Blood Type/Rh:  O+  Group B Strep:  Positive      PAST OB HISTORY        Depression:  No      Post-partum depression:  No      Diabetes:  No      Gestational Diabetes:  Yes diet controlled      Thyroid Disease:  Yes hypothyroid      Chronic HTN:  No      Gestation HTN:  No      Pre-eclampsia:  No      Seizure disorder:  No      Asthma:  No      Clotting disorder:  No      :  No      Tubal ligation:  No      D & C:  No      Cerclage:  No      LEEP:  Yes       Myomectomy:  No    OB History    Para Term  AB Living   2 1 1     1   SAB IAB Ectopic Molar Multiple Live Births             1      # Outcome Date GA Lbr Eliel/2nd Weight Sex Delivery Anes PTL Lv   2 Current            1 Term 03 41w0d  8 lb 13 oz (3.997 kg) M Vag-Spont EPI N JAXSON     Past Gynecological History:      Menarche:  11  Last menstrual period:  Patient's last menstrual period was 2021. History of uterine fibroids:  No  History of endometriosis:  Yes Lap. Pap History:  Last PAP was normal; 2021.   Sexually transmitted disease history: Herpes Simplex Virus, Ureaplasma, BV    Past Medical History:        Diagnosis Date    Abnormal Pap smear of cervix     Chronic back pain     Dazed state 2017    Diabetes mellitus ([facility] [address]     GDM     Dizziness 2017    Endometriosis     Fibromyalgia     Headache     Hypothyroidism 2017    Impaired cognition 2017    Lightheaded 2017    Mental disorder     Migraine 2017    1 to 2 per month, 7/10 on the pain scale    Neck pain 2017    Painful sensitiveness to sound 2017    Shoulder pain, bilateral 2017     Past Surgical History:        Procedure Laterality Date    LAPAROSCOPY  2009    LEEP Bilateral 2006    MOUTH SURGERY  1990    TONGUE TIE     TONSILLECTOMY Bilateral 1989    WISDOM TOOTH EXTRACTION  2005     Social History:    TOBACCO:   reports that she has never smoked. She has never used smokeless tobacco.  ETOH:   reports previous alcohol use. DRUGS:   reports no history of drug use. MARITAL STATUS:    Family History:       Problem Relation Age of Onset    No Known Problems Mother     Brain Cancer Father         Glioblastoma     Medications Prior to Admission:  Medications Prior to Admission: calcium carbonate (TUMS) 500 MG chewable tablet, Take 1 tablet by mouth as needed for Heartburn  valACYclovir (VALTREX) 500 MG tablet, Take 500 mg by mouth daily  Prenatal MV-Min-Fe Fum-FA-DHA (PRENATAL 1 PO), Take by mouth  aspirin (ASPIRIN CHILDRENS) 81 MG chewable tablet, Take 1 tablet by mouth daily  levothyroxine (SYNTHROID) 25 MCG tablet, Take 100 mcg by mouth Daily   Allergies:  Patient has no known allergies. REVIEW OF SYSTEMS:    CONSTITUTIONAL:  negative except for  Induction of labor, GDM, Hypothyroid, Hx covid    PHYSICAL EXAM:    General appearance:  awake, alert, cooperative, no apparent distress, and appears stated age  Neurologic:  Awake, alert, oriented to name, place and time. Lungs:  No increased work of breathing, good air exchange, clear to auscultation bilaterally, no crackles or wheezing  Heart:  Normal apical impulse, regular rate and rhythm, normal S1 and S2, no S3 or S4, and no murmur noted  Abdomen: Soft, NT (without UC), Gravid. Size appropriate for dates. Fetal heart rate:  Baseline Heart Rate 130s, Moderate Variability, Accelerations, no decelerations. Pelvis:  External genitalia without lesions.   Cervix:    DILATION:  8 cm  EFFACEMENT:   90  STATION:  -1 cm  CONSISTENCY: firm  POSITION:  mid  BISHOPS SCORE:      Contraction frequency:  2 minutes  Membranes:  Intact    Pelvic Ultrasound:      General Labs:      ASSESSMENT:     The patient is a 39 y.o.  2 parity 1 at 39.1 weeks    Active Problems:    39 weeks gestation of pregnancy    Gestational Diabetes - diet controlled  Hypothyroidism  Herpes suppression  Hx covid   AMA        PLAN:    Admit to Dr Ulus Lennox, notified  Labs routine and Cmp, Tsh, A1c  IV bolus then heplock  Cytotec induction  Call me when 5cm  Walk  Clear liquid diet

## 2021-12-15 NOTE — LACTATION NOTE
Mom reports baby has been latching well so far. Encouraged skin to skin and frequent attempts at breast to stimulate milk production. Instructed on normal infant behavior in the first 12-24 hours and importance of stimulating the baby frequently to eat during this time. Reviewed hand expression, and encouraged to hand express drops of colostrum when baby is sleepy. Instructed that baby may also feed 8-12 times a day- cluster feeding at times- as her milk supply is being established. Instructed on benefits of skin to skin and avoidance of pacifier / artificial nipple use until breastfeeding is well established. Educated on making sure infant has an open airway while breastfeeding and skin to skin. Instructed on hunger cues and waking techniques to try. Reviewed signs of adequate I & O; allow baby to feed ad deangelo and not to limit time at breast. Information given regarding health benefits of colostrum and exclusive breastfeeding. Encouraged to call with any concerns. Mom has a breast pump for home use. Lactation office # and [employer] sheryl information supplied for educational needs.

## 2021-12-15 NOTE — FLOWSHEET NOTE
Patient admitted into room, oriented to surroundings and admission paperwork. Parents given the [facility] informational sheet, and contents explained. Educated parents on safe sleep practices for baby. Patient denies any pain or dizziness while sitting up in bed. Phone at patient's side, instructed to use it for any needs.

## 2021-12-15 NOTE — L&D DELIVERY SUMMARY NOTE
Normal spontenous vaginal delivery of a viable girl, apgars 8/9, Wt not done at the time of this note d/t breastfeeding. Delivered over intact perineum. Placenta spontaneous, complete and without abnormalities. -400cc. Two very small lacerations repaired with lidocaine and 2-0 chromic suture, vaginal/perineal.  Breastfeeding initiated. Dr. Ryley Nielsen notified. Transfer to Mother/Baby when appropriate.

## 2021-12-15 NOTE — PROGRESS NOTES
Pt here for IOL. 39 weeks, . Pt AMA and has GDM, was seeing MFM. Medical Hx: hypothyroidism, states she has been diagnosed with uterine fibroids. +FM, denies LOF or VB  EFM applied, FHT reassuring. IOL orders received from [facility] for cytotec, PCN for +GBS, and IV saline locked after 500 ml bolus.

## 2021-12-15 NOTE — PROGRESS NOTES
S: Very tense, breathing rapidly, crawling up the bed, wanting to push, Fob at bs, supportive. O: VSS. Fhts 130s mod gisella, accels. Uc q 1.5-2.5', VE: 8cm/90/-1 very tight/firm. 2 dose Pcn given. Labs: Cmp wnl, ; A1c 5.3; Tsh 1.87; Wbc 8.8, H&H 13.7/40.7, Plt 162; UDS (-).     A: Transitional labor   Mom/baby stable   Hypothyroidism   GDM - diet controlled   ERIC    P: Enc pt - slow down breathing, pant blow   Risks for tearing cervix explained to pt   Con't expectant mgmt   Dr Faith Barbosa updated   Dr Faith Barbosa was notified on admission as well and mgmt discussed   Called MFM office for corrected report, pt not on insulin

## 2021-12-15 NOTE — PROGRESS NOTES
Prema GUZMAN updatyed pt SVE 2/60/-3 4 hours after cytotec. Pt Ctx q2-3 minutes. FHT reassuring. Order received for 50 mcg of cytotex.

## 2021-12-16 PROBLEM — R00.2 PALPITATIONS: Status: ACTIVE | Noted: 2021-12-16

## 2021-12-16 PROBLEM — R00.0 TACHYCARDIA: Status: ACTIVE | Noted: 2021-12-16

## 2021-12-16 LAB
GLUCOSE FASTING: 75 MG/DL (ref 74–99)
HCT VFR BLD CALC: 32.4 % (ref 34–48)
HEMOGLOBIN: 11 G/DL (ref 11.5–15.5)

## 2021-12-16 PROCEDURE — 1220000000 HC SEMI PRIVATE OB R&B

## 2021-12-16 PROCEDURE — 82947 ASSAY GLUCOSE BLOOD QUANT: CPT

## 2021-12-16 PROCEDURE — 6370000000 HC RX 637 (ALT 250 FOR IP): Performed by: ADVANCED PRACTICE MIDWIFE

## 2021-12-16 PROCEDURE — 36415 COLL VENOUS BLD VENIPUNCTURE: CPT

## 2021-12-16 PROCEDURE — 85018 HEMOGLOBIN: CPT

## 2021-12-16 PROCEDURE — 85014 HEMATOCRIT: CPT

## 2021-12-16 RX ORDER — MODIFIED LANOLIN
OINTMENT (GRAM) TOPICAL PRN
Status: DISCONTINUED | OUTPATIENT
Start: 2021-12-16 | End: 2021-12-17 | Stop reason: HOSPADM

## 2021-12-16 RX ADMIN — DOCUSATE SODIUM 100 MG: 100 CAPSULE ORAL at 09:32

## 2021-12-16 RX ADMIN — LEVOTHYROXINE SODIUM 100 MCG: 100 TABLET ORAL at 07:23

## 2021-12-16 RX ADMIN — METFORMIN HYDROCHLORIDE 1 TABLET: 500 TABLET, EXTENDED RELEASE ORAL at 09:32

## 2021-12-16 RX ADMIN — BENZOCAINE AND LEVOMENTHOL: 200; 5 SPRAY TOPICAL at 00:30

## 2021-12-16 RX ADMIN — IBUPROFEN 800 MG: 800 TABLET, FILM COATED ORAL at 20:40

## 2021-12-16 RX ADMIN — DOCUSATE SODIUM 100 MG: 100 CAPSULE ORAL at 20:38

## 2021-12-16 RX ADMIN — IBUPROFEN 800 MG: 800 TABLET, FILM COATED ORAL at 09:32

## 2021-12-16 ASSESSMENT — PAIN SCALES - GENERAL
PAINLEVEL_OUTOF10: 3
PAINLEVEL_OUTOF10: 0
PAINLEVEL_OUTOF10: 3

## 2021-12-16 NOTE — LACTATION NOTE
Patient c/o of sore nipples with initial latch and requested assistance with feeding. Baby sleepy and needed stimulated to feed-undressed down to diaper for skin to skin feeding. Instructed patient on positioning baby at breast in football hold using nose to nipple technique. Taught hand expression-drops of colostrum expressed onto baby's lips. Baby latched well with sporadic, audible swallows. Baby sleepy and needed stimulated during feeding on left breast.  Awakened and nursed well on right breast with audible swallows. Patient reports improved comfort during feedings. Reviewed feeding cues, positioning, latch techniques, expected I & O, importance of frequent feedings. Answered parent's questions and encouraged to call with any needs or concerns.

## 2021-12-16 NOTE — PLAN OF CARE
Problem: Fluid Volume - Imbalance:  Goal: Absence of postpartum hemorrhage signs and symptoms  Outcome: Met This Shift     Problem: Infection - Intrapartum Infection:  Goal: Will show no infection signs and symptoms  12/16/2021 0331 by Blayne Barnard RN  Outcome: Met This Shift  12/16/2021 0330 by Blayne Barnard RN  Outcome: Met This Shift     Problem: Pain - Acute:  Goal: Pain level will decrease  12/16/2021 0331 by Blayne Barnard RN  Outcome: Met This Shift  12/16/2021 0330 by Blayne Barnard RN  Outcome: Met This Shift  Goal: Able to cope with pain  12/16/2021 0331 by Blayne Barnard RN  Outcome: Met This Shift  12/16/2021 0330 by Blayne Barnard RN  Outcome: Met This Shift     Problem: Pain:  Goal: Pain level will decrease  12/16/2021 0331 by Blayne Barnard RN  Outcome: Met This Shift  12/16/2021 0330 by Blayne Barnard RN  Outcome: Met This Shift  Goal: Control of acute pain  12/16/2021 0331 by Blayne Barnard RN  Outcome: Met This Shift  12/16/2021 0330 by Blayne Barnard RN  Outcome: Met This Shift  Goal: Control of chronic pain  12/16/2021 0331 by Blayne Barnard RN  Outcome: Met This Shift  12/16/2021 0330 by Blayne Barnard RN  Outcome: Met This Shift     Problem: Breastfeeding - Ineffective:  Goal: Infant able to latch onto breast  Outcome: Met This Shift  Goal: Intact skin on mother's nipple  Outcome: Met This Shift  Goal: Uninterrupted skin-to-skin contact during initial breast-feeding if medically possible  Outcome: Met This Shift  Goal: Urine and stool output as expected for age  Outcome: Met This Shift  Goal: Weight loss within specified parameters for age  Outcome: Met This Shift     Problem: Discharge Planning:  Goal: Discharged to appropriate level of care  Outcome: Met This Shift     Problem: Constipation:  Goal: Bowel elimination is within specified parameters  Outcome: Met This Shift     Problem: Infection - Risk of, Puerperal Infection:  Goal: Will show no infection signs and symptoms  12/16/2021 0331 by Marcelina Hawthorne RN  Outcome: Met This Shift  12/16/2021 0330 by Marcelina Hawthorne RN  Outcome: Met This Shift     Problem: Mood - Altered:  Goal: Mood stable  Outcome: Met This Shift

## 2021-12-17 VITALS
DIASTOLIC BLOOD PRESSURE: 63 MMHG | SYSTOLIC BLOOD PRESSURE: 117 MMHG | RESPIRATION RATE: 16 BRPM | HEART RATE: 76 BPM | HEIGHT: 63 IN | TEMPERATURE: 98 F | OXYGEN SATURATION: 99 % | BODY MASS INDEX: 30.65 KG/M2 | WEIGHT: 173 LBS

## 2021-12-17 PROBLEM — Z3A.39 39 WEEKS GESTATION OF PREGNANCY: Status: RESOLVED | Noted: 2021-12-15 | Resolved: 2021-12-17

## 2021-12-17 PROCEDURE — 6370000000 HC RX 637 (ALT 250 FOR IP): Performed by: ADVANCED PRACTICE MIDWIFE

## 2021-12-17 RX ORDER — LEVOTHYROXINE SODIUM 0.1 MG/1
100 TABLET ORAL DAILY
Qty: 30 TABLET | Refills: 1 | Status: SHIPPED | OUTPATIENT
Start: 2021-12-18

## 2021-12-17 RX ORDER — VALACYCLOVIR HYDROCHLORIDE 500 MG/1
500 TABLET, FILM COATED ORAL DAILY
Qty: 30 TABLET | Refills: 1 | Status: SHIPPED | OUTPATIENT
Start: 2021-12-17

## 2021-12-17 RX ORDER — IBUPROFEN 800 MG/1
800 TABLET ORAL EVERY 8 HOURS PRN
Qty: 30 TABLET | Refills: 0 | Status: SHIPPED | OUTPATIENT
Start: 2021-12-17

## 2021-12-17 RX ADMIN — LEVOTHYROXINE SODIUM 100 MCG: 100 TABLET ORAL at 06:17

## 2021-12-17 RX ADMIN — DOCUSATE SODIUM 100 MG: 100 CAPSULE ORAL at 09:03

## 2021-12-17 RX ADMIN — METFORMIN HYDROCHLORIDE 1 TABLET: 500 TABLET, EXTENDED RELEASE ORAL at 09:03

## 2021-12-17 NOTE — PROGRESS NOTES
Pt. Called due to having a larger size clot in her pad. Clot is baseball sized but breaks apart easily. Pt. Educated to call if she has any other clots the size or bigger of a golf ball.

## 2021-12-17 NOTE — FLOWSHEET NOTE
Discharge teaching and instructions given to patient on self and . Safe sleep for  discussed. Follow up discussed.

## 2021-12-17 NOTE — PLAN OF CARE
Problem: Infection - Intrapartum Infection:  Goal: Will show no infection signs and symptoms  Description: Will show no infection signs and symptoms  12/16/2021 2351 by Asa Friedman RN  Outcome: Met This Shift     Problem: Pain - Acute:  Goal: Pain level will decrease  Description: Pain level will decrease  12/16/2021 2351 by Asa Friedman RN  Outcome: Met This Shift     Problem: Pain:  Goal: Pain level will decrease  Description: Pain level will decrease  12/16/2021 2351 by Asa Friedman RN  Outcome: Met This Shift     Problem: Infection - Risk of, Puerperal Infection:  Goal: Will show no infection signs and symptoms  Description: Will show no infection signs and symptoms  12/16/2021 2351 by Asa Friedman RN  Outcome: Met This Shift

## 2021-12-17 NOTE — DISCHARGE SUMMARY
43yo w/f being discharged home on 2nd postpartum day following a normal vaginal delivery of a viable baby girl. Two small vaginal/perineal lacerations repaired, healing w/o complications. Mom and baby stable on discharge. Home Rx: Motrin, Synthroid, Valacyclovair. Rto 6wk and prn. Dr Stephens Child aware.

## 2021-12-20 ENCOUNTER — TELEPHONE (OUTPATIENT)
Dept: OBGYN CLINIC | Age: 41
End: 2021-12-20

## 2021-12-20 DIAGNOSIS — E56.9 VITAMIN DEFICIENCY: Primary | ICD-10-CM

## 2021-12-20 RX ORDER — MELATONIN
2000 DAILY
Qty: 60 TABLET | Refills: 2 | Status: SHIPPED | OUTPATIENT
Start: 2021-12-20

## 2021-12-20 NOTE — TELEPHONE ENCOUNTER
Pt returned call Informed of Dr Hayden Pennington recommendation for Vitamin D3 and Vitamin B12. Informed that orders were sent. Pt also informed that  recommends nutritional panel be redrawn in 6-8 weeks and that [facility] office was also sent recommendations.

## 2021-12-20 NOTE — TELEPHONE ENCOUNTER
Left message for pt to return call regarding Dr Fide Coronado recommendations for Vitman B12 and Vitamin D3

## 2024-02-15 LAB
BASOPHILS # BLD: 0.07 K/UL (ref 0–0.2)
BASOPHILS NFR BLD: 1 % (ref 0–2)
EOSINOPHIL # BLD: 0.51 K/UL (ref 0.05–0.5)
EOSINOPHILS RELATIVE PERCENT: 6 % (ref 0–6)
ERYTHROCYTE [DISTWIDTH] IN BLOOD BY AUTOMATED COUNT: 13.7 % (ref 11.5–15)
HCT VFR BLD AUTO: 40.1 % (ref 34–48)
HGB BLD-MCNC: 13.3 G/DL (ref 11.5–15.5)
IMM GRANULOCYTES # BLD AUTO: 0.07 K/UL (ref 0–0.58)
IMM GRANULOCYTES NFR BLD: 1 % (ref 0–5)
LYMPHOCYTES NFR BLD: 1.14 K/UL (ref 1.5–4)
LYMPHOCYTES RELATIVE PERCENT: 13 % (ref 20–42)
MCH RBC QN AUTO: 30.6 PG (ref 26–35)
MCHC RBC AUTO-ENTMCNC: 33.2 G/DL (ref 32–34.5)
MCV RBC AUTO: 92.2 FL (ref 80–99.9)
MONOCYTES NFR BLD: 0.89 K/UL (ref 0.1–0.95)
MONOCYTES NFR BLD: 10 % (ref 2–12)
NEUTROPHILS NFR BLD: 70 % (ref 43–80)
NEUTS SEG NFR BLD: 6.27 K/UL (ref 1.8–7.3)
PLATELET # BLD AUTO: 255 K/UL (ref 130–450)
PMV BLD AUTO: 10.8 FL (ref 7–12)
RBC # BLD AUTO: 4.35 M/UL (ref 3.5–5.5)
WBC OTHER # BLD: 9 K/UL (ref 4.5–11.5)

## 2024-02-16 LAB — ANA SER QL IA: NEGATIVE

## 2024-02-19 LAB
EBV EA-D IGG SER-ACNC: 158 U/ML
EBV INTERPRETATION: ABNORMAL
EBV NA IGG SER IA-ACNC: 73 U/ML
EBV VCA IGG SER-ACNC: 550 U/ML
EBV VCA IGM SER-ACNC: 28 U/ML

## 2024-06-28 LAB
A ALTERNATA IGE QN: NORMAL KU/L (ref 0–0.34)
A FUMIGATUS IGE QN: NORMAL KU/L (ref 0–0.34)
ALLERGEN BIRCH IGE: NORMAL KU/L (ref 0–0.34)
BERMUDA GRASS IGE QN: NORMAL KU/L (ref 0–0.34)
BOXELDER IGE QN: NORMAL KU/L (ref 0–0.34)
C HERBARUM IGE QN: NORMAL KUL/L (ref 0–0.34)
CALIF WALNUT POLN IGE QN: NORMAL KU/L (ref 0–0.34)
CAT DANDER IGE QN: NORMAL KU/L (ref 0–0.34)
CMN PIGWEED IGE QN: NORMAL KU/L (ref 0–0.34)
COMMON RAGWEED IGE QN: NORMAL KU/L (ref 0–0.34)
COTTONWOOD IGE QN: NORMAL KU/L (ref 0–0.34)
D FARINAE IGE QN: NORMAL KU/L (ref 0–0.34)
D PTERONYSS IGE QN: NORMAL KU/L (ref 0–0.34)
DOG DANDER IGE QN: NORMAL KU/L (ref 0–0.34)
IGE SERPL-ACNC: 29 IU/ML (ref 0–100)
LONDON PLANE IGE QN: NORMAL KU/L (ref 0–0.34)
M RACEMOSUS IGE QN: NORMAL KU/L (ref 0–0.34)
MOUSE EPITH IGE QN: NORMAL KU/L (ref 0–0.34)
MT JUNIPER IGE QN: NORMAL KU/L (ref 0–0.34)
P NOTATUM IGE QN: NORMAL KU/L (ref 0–0.34)
PECAN/HICK TREE IGE QN: NORMAL KU/L (ref 0–0.34)
ROACH IGE QN: NORMAL KU/L (ref 0–0.34)
SALTWORT IGE QN: NORMAL KU/L (ref 0–0.34)
SHEEP SORREL IGE QN: NORMAL KU/L (ref 0–0.34)
TIMOTHY IGE QN: NORMAL KU/L (ref 0–0.34)
WHITE ASH IGE QN: NORMAL KU/L (ref 0–0.34)
WHITE ELM IGE QN: NORMAL KU/L (ref 0–0.34)
WHITE MULBERRY IGE QN: NORMAL KU/L (ref 0–0.34)
WHITE OAK IGE QN: NORMAL KU/L (ref 0–0.34)

## 2024-07-01 LAB
A ALTERNATA IGE QN: <0.1 KU/L (ref 0–0.34)
A FUMIGATUS IGE QN: <0.1 KU/L (ref 0–0.34)
ALLERGEN BIRCH IGE: <0.1 KU/L (ref 0–0.34)
BERMUDA GRASS IGE QN: 0.27 KU/L (ref 0–0.34)
BOXELDER IGE QN: <0.1 KU/L (ref 0–0.34)
C HERBARUM IGE QN: <0.1 KUL/L (ref 0–0.34)
CALIF WALNUT POLN IGE QN: <0.1 KU/L (ref 0–0.34)
CAT DANDER IGE QN: <0.1 KU/L (ref 0–0.34)
CMN PIGWEED IGE QN: 0.12 KU/L (ref 0–0.34)
COMMON RAGWEED IGE QN: <0.1 KU/L (ref 0–0.34)
COTTONWOOD IGE QN: <0.1 KU/L (ref 0–0.34)
D FARINAE IGE QN: <0.1 KU/L (ref 0–0.34)
D PTERONYSS IGE QN: <0.1 KU/L (ref 0–0.34)
DOG DANDER IGE QN: <0.1 KU/L (ref 0–0.34)
IGE SERPL-ACNC: 29 IU/ML (ref 0–100)
LONDON PLANE IGE QN: <0.1 KU/L (ref 0–0.34)
M RACEMOSUS IGE QN: <0.1 KU/L (ref 0–0.34)
MOUSE EPITH IGE QN: <0.1 KU/L (ref 0–0.34)
MT JUNIPER IGE QN: <0.1 KU/L (ref 0–0.34)
P NOTATUM IGE QN: <0.1 KU/L (ref 0–0.34)
PECAN/HICK TREE IGE QN: <0.1 KU/L (ref 0–0.34)
ROACH IGE QN: <0.1 KU/L (ref 0–0.34)
SALTWORT IGE QN: <0.1 KU/L (ref 0–0.34)
SHEEP SORREL IGE QN: <0.1 KU/L (ref 0–0.34)
TIMOTHY IGE QN: 0.48 KU/L (ref 0–0.34)
WHITE ASH IGE QN: 0.11 KU/L (ref 0–0.34)
WHITE ELM IGE QN: 0.25 KU/L (ref 0–0.34)
WHITE MULBERRY IGE QN: <0.1 KU/L (ref 0–0.34)
WHITE OAK IGE QN: <0.1 KU/L (ref 0–0.34)

## 2024-09-01 LAB — RICE IGE QN: 0.78 KU/L (ref 0–0.34)

## 2024-09-08 ENCOUNTER — ANESTHESIA (OUTPATIENT)
Dept: LABOR AND DELIVERY | Age: 44
DRG: 560 | End: 2024-09-08
Payer: COMMERCIAL

## 2024-09-08 ENCOUNTER — HOSPITAL ENCOUNTER (INPATIENT)
Age: 44
LOS: 2 days | Discharge: HOME | DRG: 560 | End: 2024-09-10
Attending: OBSTETRICS & GYNECOLOGY | Admitting: OBSTETRICS & GYNECOLOGY
Payer: COMMERCIAL

## 2024-09-08 ENCOUNTER — APPOINTMENT (OUTPATIENT)
Dept: LABOR AND DELIVERY | Age: 44
DRG: 560 | End: 2024-09-08
Payer: COMMERCIAL

## 2024-09-08 ENCOUNTER — ANESTHESIA EVENT (OUTPATIENT)
Dept: LABOR AND DELIVERY | Age: 44
DRG: 560 | End: 2024-09-08
Payer: COMMERCIAL

## 2024-09-08 PROBLEM — Z86.16 PERSONAL HISTORY OF COVID-19: Status: RESOLVED | Noted: 2021-12-15 | Resolved: 2024-09-08

## 2024-09-08 PROBLEM — Z3A.36 36 WEEKS GESTATION OF PREGNANCY: Status: RESOLVED | Noted: 2021-11-26 | Resolved: 2024-09-08

## 2024-09-08 PROBLEM — R00.0 TACHYCARDIA: Status: RESOLVED | Noted: 2021-12-16 | Resolved: 2024-09-08

## 2024-09-08 PROBLEM — Z34.90 ENCOUNTER FOR INDUCTION OF LABOR: Status: ACTIVE | Noted: 2024-09-08

## 2024-09-08 PROBLEM — R00.2 PALPITATIONS: Status: RESOLVED | Noted: 2021-12-16 | Resolved: 2024-09-08

## 2024-09-08 PROBLEM — R42 DIZZINESS: Status: RESOLVED | Noted: 2021-11-14 | Resolved: 2024-09-08

## 2024-09-08 PROBLEM — O24.419 GESTATIONAL DIABETES MELLITUS (GDM): Status: RESOLVED | Noted: 2021-12-15 | Resolved: 2024-09-08

## 2024-09-08 LAB
ABO + RH BLD: NORMAL
AMPHET UR QL SCN: NEGATIVE
ARM BAND NUMBER: NORMAL
BARBITURATES UR QL SCN: NEGATIVE
BENZODIAZ UR QL: NEGATIVE
BLOOD BANK SAMPLE EXPIRATION: NORMAL
BLOOD GROUP ANTIBODIES SERPL: NEGATIVE
BUPRENORPHINE UR QL: NEGATIVE
CANNABINOIDS UR QL SCN: NEGATIVE
COCAINE UR QL SCN: NEGATIVE
ERYTHROCYTE [DISTWIDTH] IN BLOOD BY AUTOMATED COUNT: 13.2 % (ref 11.5–15)
FENTANYL UR QL: NEGATIVE
HCT VFR BLD AUTO: 35.3 % (ref 34–48)
HGB BLD-MCNC: 11.9 G/DL (ref 11.5–15.5)
MCH RBC QN AUTO: 30.8 PG (ref 26–35)
MCHC RBC AUTO-ENTMCNC: 33.7 G/DL (ref 32–34.5)
MCV RBC AUTO: 91.5 FL (ref 80–99.9)
METHADONE UR QL: NEGATIVE
OPIATES UR QL SCN: NEGATIVE
OXYCODONE UR QL SCN: NEGATIVE
PCP UR QL SCN: NEGATIVE
PLATELET # BLD AUTO: 168 K/UL (ref 130–450)
PMV BLD AUTO: 11.6 FL (ref 7–12)
RBC # BLD AUTO: 3.86 M/UL (ref 3.5–5.5)
TEST INFORMATION: NORMAL
WBC OTHER # BLD: 9.5 K/UL (ref 4.5–11.5)

## 2024-09-08 PROCEDURE — 1220000001 HC SEMI PRIVATE L&D R&B

## 2024-09-08 PROCEDURE — 3E0P7VZ INTRODUCTION OF HORMONE INTO FEMALE REPRODUCTIVE, VIA NATURAL OR ARTIFICIAL OPENING: ICD-10-PCS | Performed by: ADVANCED PRACTICE MIDWIFE

## 2024-09-08 PROCEDURE — 80307 DRUG TEST PRSMV CHEM ANLYZR: CPT

## 2024-09-08 PROCEDURE — 2580000003 HC RX 258: Performed by: OBSTETRICS & GYNECOLOGY

## 2024-09-08 PROCEDURE — 86900 BLOOD TYPING SEROLOGIC ABO: CPT

## 2024-09-08 PROCEDURE — 86901 BLOOD TYPING SEROLOGIC RH(D): CPT

## 2024-09-08 PROCEDURE — 6360000002 HC RX W HCPCS

## 2024-09-08 PROCEDURE — 86850 RBC ANTIBODY SCREEN: CPT

## 2024-09-08 PROCEDURE — 85027 COMPLETE CBC AUTOMATED: CPT

## 2024-09-08 PROCEDURE — 6370000000 HC RX 637 (ALT 250 FOR IP): Performed by: ADVANCED PRACTICE MIDWIFE

## 2024-09-08 PROCEDURE — 7200000001 HC VAGINAL DELIVERY

## 2024-09-08 PROCEDURE — 6370000000 HC RX 637 (ALT 250 FOR IP): Performed by: OBSTETRICS & GYNECOLOGY

## 2024-09-08 PROCEDURE — 6360000002 HC RX W HCPCS: Performed by: ADVANCED PRACTICE MIDWIFE

## 2024-09-08 PROCEDURE — 2580000003 HC RX 258: Performed by: ADVANCED PRACTICE MIDWIFE

## 2024-09-08 RX ORDER — ONDANSETRON 4 MG/1
4 TABLET, ORALLY DISINTEGRATING ORAL EVERY 6 HOURS PRN
Status: DISCONTINUED | OUTPATIENT
Start: 2024-09-08 | End: 2024-09-09

## 2024-09-08 RX ORDER — MODIFIED LANOLIN
OINTMENT (GRAM) TOPICAL PRN
Status: DISCONTINUED | OUTPATIENT
Start: 2024-09-08 | End: 2024-09-10 | Stop reason: HOSPADM

## 2024-09-08 RX ORDER — TRANEXAMIC ACID 10 MG/ML
1000 INJECTION, SOLUTION INTRAVENOUS
Status: DISCONTINUED | OUTPATIENT
Start: 2024-09-08 | End: 2024-09-09

## 2024-09-08 RX ORDER — LEVOTHYROXINE SODIUM 137 UG/1
137 TABLET ORAL DAILY
Status: DISCONTINUED | OUTPATIENT
Start: 2024-09-08 | End: 2024-09-08

## 2024-09-08 RX ORDER — TERBUTALINE SULFATE 1 MG/ML
0.25 INJECTION, SOLUTION SUBCUTANEOUS
Status: DISCONTINUED | OUTPATIENT
Start: 2024-09-08 | End: 2024-09-09

## 2024-09-08 RX ORDER — SODIUM CHLORIDE, SODIUM LACTATE, POTASSIUM CHLORIDE, AND CALCIUM CHLORIDE .6; .31; .03; .02 G/100ML; G/100ML; G/100ML; G/100ML
500 INJECTION, SOLUTION INTRAVENOUS PRN
Status: DISCONTINUED | OUTPATIENT
Start: 2024-09-08 | End: 2024-09-09

## 2024-09-08 RX ORDER — FERROUS SULFATE 325(65) MG
325 TABLET ORAL EVERY OTHER DAY
Status: DISCONTINUED | OUTPATIENT
Start: 2024-09-09 | End: 2024-09-10 | Stop reason: HOSPADM

## 2024-09-08 RX ORDER — MISOPROSTOL 200 UG/1
400 TABLET ORAL PRN
Status: DISCONTINUED | OUTPATIENT
Start: 2024-09-08 | End: 2024-09-09

## 2024-09-08 RX ORDER — SODIUM CHLORIDE, SODIUM LACTATE, POTASSIUM CHLORIDE, AND CALCIUM CHLORIDE .6; .31; .03; .02 G/100ML; G/100ML; G/100ML; G/100ML
500 INJECTION, SOLUTION INTRAVENOUS ONCE
Status: COMPLETED | OUTPATIENT
Start: 2024-09-08 | End: 2024-09-08

## 2024-09-08 RX ORDER — ACETAMINOPHEN 650 MG
TABLET, EXTENDED RELEASE ORAL
Status: DISCONTINUED
Start: 2024-09-08 | End: 2024-09-09

## 2024-09-08 RX ORDER — LIDOCAINE HYDROCHLORIDE 20 MG/ML
JELLY TOPICAL
Status: DISCONTINUED
Start: 2024-09-08 | End: 2024-09-09

## 2024-09-08 RX ORDER — VALACYCLOVIR HYDROCHLORIDE 500 MG/1
500 TABLET, FILM COATED ORAL DAILY
Status: DISCONTINUED | OUTPATIENT
Start: 2024-09-08 | End: 2024-09-10 | Stop reason: HOSPADM

## 2024-09-08 RX ORDER — LEVOTHYROXINE SODIUM 112 UG/1
112 TABLET ORAL DAILY
Status: DISCONTINUED | OUTPATIENT
Start: 2024-09-08 | End: 2024-09-10 | Stop reason: HOSPADM

## 2024-09-08 RX ORDER — LEVOTHYROXINE SODIUM 25 UG/1
25 TABLET ORAL DAILY
Status: DISCONTINUED | OUTPATIENT
Start: 2024-09-08 | End: 2024-09-10 | Stop reason: HOSPADM

## 2024-09-08 RX ORDER — ACETAMINOPHEN 500 MG
1000 TABLET ORAL EVERY 8 HOURS SCHEDULED
Status: DISCONTINUED | OUTPATIENT
Start: 2024-09-09 | End: 2024-09-10 | Stop reason: HOSPADM

## 2024-09-08 RX ORDER — IBUPROFEN 800 MG/1
800 TABLET, FILM COATED ORAL EVERY 8 HOURS SCHEDULED
Status: DISCONTINUED | OUTPATIENT
Start: 2024-09-09 | End: 2024-09-10 | Stop reason: HOSPADM

## 2024-09-08 RX ORDER — PENICILLIN G POTASSIUM 5000000 [IU]/1
INJECTION, POWDER, FOR SOLUTION INTRAMUSCULAR; INTRAVENOUS
Status: COMPLETED
Start: 2024-09-08 | End: 2024-09-08

## 2024-09-08 RX ORDER — CARBOPROST TROMETHAMINE 250 UG/ML
250 INJECTION, SOLUTION INTRAMUSCULAR PRN
Status: DISCONTINUED | OUTPATIENT
Start: 2024-09-08 | End: 2024-09-10 | Stop reason: HOSPADM

## 2024-09-08 RX ORDER — ONDANSETRON 2 MG/ML
4 INJECTION INTRAMUSCULAR; INTRAVENOUS EVERY 6 HOURS PRN
Status: DISCONTINUED | OUTPATIENT
Start: 2024-09-08 | End: 2024-09-09

## 2024-09-08 RX ORDER — METHYLERGONOVINE MALEATE 0.2 MG/ML
200 INJECTION INTRAVENOUS PRN
Status: DISCONTINUED | OUTPATIENT
Start: 2024-09-08 | End: 2024-09-10 | Stop reason: HOSPADM

## 2024-09-08 RX ORDER — DOCUSATE SODIUM 100 MG/1
100 CAPSULE, LIQUID FILLED ORAL 2 TIMES DAILY
Status: DISCONTINUED | OUTPATIENT
Start: 2024-09-09 | End: 2024-09-10 | Stop reason: HOSPADM

## 2024-09-08 RX ORDER — PENICILLIN G 3000000 [IU]/50ML
3 INJECTION, SOLUTION INTRAVENOUS EVERY 4 HOURS
Status: DISCONTINUED | OUTPATIENT
Start: 2024-09-08 | End: 2024-09-09

## 2024-09-08 RX ORDER — SODIUM CHLORIDE, SODIUM LACTATE, POTASSIUM CHLORIDE, CALCIUM CHLORIDE 600; 310; 30; 20 MG/100ML; MG/100ML; MG/100ML; MG/100ML
INJECTION, SOLUTION INTRAVENOUS CONTINUOUS
Status: DISCONTINUED | OUTPATIENT
Start: 2024-09-08 | End: 2024-09-09

## 2024-09-08 RX ADMIN — LEVOTHYROXINE SODIUM 25 MCG: 25 TABLET ORAL at 08:52

## 2024-09-08 RX ADMIN — LEVOTHYROXINE SODIUM 112 MCG: 0.11 TABLET ORAL at 08:51

## 2024-09-08 RX ADMIN — Medication 25 MCG: at 12:31

## 2024-09-08 RX ADMIN — SODIUM CHLORIDE, POTASSIUM CHLORIDE, SODIUM LACTATE AND CALCIUM CHLORIDE 500 ML: 600; 310; 30; 20 INJECTION, SOLUTION INTRAVENOUS at 08:30

## 2024-09-08 RX ADMIN — PENICILLIN G 3 MILLION UNITS: 3000000 INJECTION, SOLUTION INTRAVENOUS at 17:20

## 2024-09-08 RX ADMIN — Medication 25 MCG: at 08:13

## 2024-09-08 RX ADMIN — IBUPROFEN 800 MG: 800 TABLET, FILM COATED ORAL at 23:48

## 2024-09-08 RX ADMIN — SODIUM CHLORIDE, POTASSIUM CHLORIDE, SODIUM LACTATE AND CALCIUM CHLORIDE: 600; 310; 30; 20 INJECTION, SOLUTION INTRAVENOUS at 07:00

## 2024-09-08 RX ADMIN — PENICILLIN G POTASSIUM 5 MILLION UNITS: 5000000 INJECTION, POWDER, FOR SOLUTION INTRAMUSCULAR; INTRAVENOUS at 13:14

## 2024-09-08 ASSESSMENT — PAIN DESCRIPTION - DESCRIPTORS: DESCRIPTORS: ACHING;CRAMPING

## 2024-09-08 ASSESSMENT — PAIN DESCRIPTION - LOCATION: LOCATION: ABDOMEN

## 2024-09-08 ASSESSMENT — PAIN SCALES - GENERAL: PAINLEVEL_OUTOF10: 3

## 2024-09-09 PROBLEM — O09.529 ADVANCED MATERNAL AGE IN MULTIGRAVIDA: Status: RESOLVED | Noted: 2021-12-15 | Resolved: 2024-09-09

## 2024-09-09 LAB
HCT VFR BLD AUTO: 33.1 % (ref 34–48)
HGB BLD-MCNC: 11.2 G/DL (ref 11.5–15.5)

## 2024-09-09 PROCEDURE — 6370000000 HC RX 637 (ALT 250 FOR IP): Performed by: ADVANCED PRACTICE MIDWIFE

## 2024-09-09 PROCEDURE — 85014 HEMATOCRIT: CPT

## 2024-09-09 PROCEDURE — 36415 COLL VENOUS BLD VENIPUNCTURE: CPT

## 2024-09-09 PROCEDURE — 85018 HEMOGLOBIN: CPT

## 2024-09-09 PROCEDURE — 1220000000 HC SEMI PRIVATE OB R&B

## 2024-09-09 RX ORDER — HYDROCORTISONE 25 MG/G
CREAM TOPICAL
COMMUNITY
Start: 2024-09-09

## 2024-09-09 RX ORDER — IBUPROFEN 800 MG/1
800 TABLET, FILM COATED ORAL EVERY 8 HOURS PRN
Qty: 30 TABLET | Refills: 0 | Status: SHIPPED | OUTPATIENT
Start: 2024-09-09

## 2024-09-09 RX ORDER — PSEUDOEPHEDRINE HCL 30 MG
100 TABLET ORAL 2 TIMES DAILY PRN
COMMUNITY
Start: 2024-09-09

## 2024-09-09 RX ORDER — HYDROCORTISONE 25 MG/G
CREAM TOPICAL 2 TIMES DAILY
Status: DISCONTINUED | OUTPATIENT
Start: 2024-09-09 | End: 2024-09-10 | Stop reason: HOSPADM

## 2024-09-09 RX ADMIN — IBUPROFEN 800 MG: 800 TABLET, FILM COATED ORAL at 08:56

## 2024-09-09 RX ADMIN — DOCUSATE SODIUM 100 MG: 100 CAPSULE, LIQUID FILLED ORAL at 08:56

## 2024-09-09 RX ADMIN — LEVOTHYROXINE SODIUM 25 MCG: 25 TABLET ORAL at 08:58

## 2024-09-09 RX ADMIN — DOCUSATE SODIUM 100 MG: 100 CAPSULE, LIQUID FILLED ORAL at 21:00

## 2024-09-09 RX ADMIN — LEVOTHYROXINE SODIUM 112 MCG: 0.11 TABLET ORAL at 08:56

## 2024-09-09 RX ADMIN — IBUPROFEN 800 MG: 800 TABLET, FILM COATED ORAL at 18:53

## 2024-09-09 RX ADMIN — VALACYCLOVIR 500 MG: 500 TABLET, FILM COATED ORAL at 18:52

## 2024-09-09 RX ADMIN — ACETAMINOPHEN 1000 MG: 500 TABLET ORAL at 20:59

## 2024-09-09 RX ADMIN — HYDROCORTISONE 2.5%: 25 CREAM TOPICAL at 22:07

## 2024-09-09 ASSESSMENT — PAIN DESCRIPTION - DESCRIPTORS
DESCRIPTORS: CRAMPING;DISCOMFORT
DESCRIPTORS: ACHING;DISCOMFORT;SORE
DESCRIPTORS: DISCOMFORT

## 2024-09-09 ASSESSMENT — PAIN - FUNCTIONAL ASSESSMENT
PAIN_FUNCTIONAL_ASSESSMENT: ACTIVITIES ARE NOT PREVENTED

## 2024-09-09 ASSESSMENT — PAIN SCALES - GENERAL
PAINLEVEL_OUTOF10: 4
PAINLEVEL_OUTOF10: 3
PAINLEVEL_OUTOF10: 3
PAINLEVEL_OUTOF10: 0

## 2024-09-09 ASSESSMENT — PAIN DESCRIPTION - ORIENTATION: ORIENTATION: LOWER

## 2024-09-09 ASSESSMENT — PAIN DESCRIPTION - ONSET: ONSET: ON-GOING

## 2024-09-09 ASSESSMENT — PAIN DESCRIPTION - PAIN TYPE: TYPE: ACUTE PAIN

## 2024-09-09 ASSESSMENT — PAIN DESCRIPTION - LOCATION
LOCATION: BACK
LOCATION: ABDOMEN;PERINEUM

## 2024-09-10 VITALS
BODY MASS INDEX: 32.6 KG/M2 | WEIGHT: 184 LBS | TEMPERATURE: 97.9 F | SYSTOLIC BLOOD PRESSURE: 116 MMHG | RESPIRATION RATE: 16 BRPM | DIASTOLIC BLOOD PRESSURE: 62 MMHG | OXYGEN SATURATION: 98 % | HEIGHT: 63 IN | HEART RATE: 68 BPM

## 2024-09-10 PROCEDURE — 6370000000 HC RX 637 (ALT 250 FOR IP): Performed by: ADVANCED PRACTICE MIDWIFE

## 2024-09-10 RX ADMIN — LEVOTHYROXINE SODIUM 25 MCG: 25 TABLET ORAL at 06:58

## 2024-09-10 RX ADMIN — HYDROCORTISONE 2.5%: 25 CREAM TOPICAL at 10:02

## 2024-09-10 RX ADMIN — DOCUSATE SODIUM 100 MG: 100 CAPSULE, LIQUID FILLED ORAL at 10:02

## 2024-09-10 RX ADMIN — IBUPROFEN 800 MG: 800 TABLET, FILM COATED ORAL at 06:56

## 2024-09-10 RX ADMIN — LEVOTHYROXINE SODIUM 112 MCG: 0.11 TABLET ORAL at 06:57

## 2024-09-10 ASSESSMENT — PAIN SCALES - GENERAL: PAINLEVEL_OUTOF10: 3

## 2024-09-10 ASSESSMENT — PAIN DESCRIPTION - DESCRIPTORS: DESCRIPTORS: ACHING;CRAMPING;DISCOMFORT

## 2024-09-10 ASSESSMENT — PAIN DESCRIPTION - ORIENTATION: ORIENTATION: LOWER

## 2024-09-10 ASSESSMENT — PAIN DESCRIPTION - LOCATION: LOCATION: ABDOMEN

## 2024-09-10 ASSESSMENT — PAIN - FUNCTIONAL ASSESSMENT: PAIN_FUNCTIONAL_ASSESSMENT: ACTIVITIES ARE NOT PREVENTED

## 2024-12-18 ENCOUNTER — HOSPITAL ENCOUNTER (OUTPATIENT)
Age: 44
Discharge: HOME | End: 2024-12-18
Payer: COMMERCIAL

## 2024-12-18 LAB — TSH SERPL DL<=0.05 MIU/L-ACNC: 0.09 UIU/ML (ref 0.27–4.2)

## 2024-12-18 PROCEDURE — 84443 ASSAY THYROID STIM HORMONE: CPT

## 2024-12-18 PROCEDURE — 36415 COLL VENOUS BLD VENIPUNCTURE: CPT

## 2025-01-13 ENCOUNTER — OFFICE VISIT (OUTPATIENT)
Dept: OBGYN | Age: 45
End: 2025-01-13
Payer: COMMERCIAL

## 2025-01-13 VITALS
OXYGEN SATURATION: 98 % | TEMPERATURE: 98.1 F | BODY MASS INDEX: 27.99 KG/M2 | SYSTOLIC BLOOD PRESSURE: 116 MMHG | DIASTOLIC BLOOD PRESSURE: 71 MMHG | WEIGHT: 158 LBS | HEART RATE: 77 BPM

## 2025-01-13 DIAGNOSIS — E03.9 HYPOTHYROIDISM, UNSPECIFIED TYPE: Primary | ICD-10-CM

## 2025-01-13 DIAGNOSIS — R13.10 DYSPHAGIA, UNSPECIFIED TYPE: ICD-10-CM

## 2025-01-13 PROCEDURE — G8419 CALC BMI OUT NRM PARAM NOF/U: HCPCS | Performed by: ADVANCED PRACTICE MIDWIFE

## 2025-01-13 PROCEDURE — G8427 DOCREV CUR MEDS BY ELIG CLIN: HCPCS | Performed by: ADVANCED PRACTICE MIDWIFE

## 2025-01-13 PROCEDURE — 1036F TOBACCO NON-USER: CPT | Performed by: ADVANCED PRACTICE MIDWIFE

## 2025-01-13 PROCEDURE — 99213 OFFICE O/P EST LOW 20 MIN: CPT | Performed by: ADVANCED PRACTICE MIDWIFE

## 2025-01-13 PROCEDURE — 99212 OFFICE O/P EST SF 10 MIN: CPT | Performed by: ADVANCED PRACTICE MIDWIFE

## 2025-01-13 RX ORDER — LEVOTHYROXINE SODIUM 125 UG/1
125 TABLET ORAL DAILY
COMMUNITY
Start: 2025-01-08 | End: 2025-01-19

## 2025-01-13 ASSESSMENT — PATIENT HEALTH QUESTIONNAIRE - PHQ9
1. LITTLE INTEREST OR PLEASURE IN DOING THINGS: NOT AT ALL
2. FEELING DOWN, DEPRESSED OR HOPELESS: NOT AT ALL
SUM OF ALL RESPONSES TO PHQ QUESTIONS 1-9: 0
SUM OF ALL RESPONSES TO PHQ9 QUESTIONS 1 & 2: 0

## 2025-01-13 NOTE — PROGRESS NOTES
Jillian Bolanos (:  1980) is a 44 y.o. female,Established patient, here for evaluation of the following chief complaint(s):  Results    Subjective   HPI: Here for thyroid f/u and c/o diff swallowing, food getting stuck, even soft food needs water to go down. Seen someone at  in 2017 thyroid was enlarged and no f/u u/s since. Onset probably end of October.     ROS: No URI or sx. Denies Sob, chest pain etc. No abd issues. Periods - just got first period since birth of son in Sept, Lmp 12/15/24 exclusively bsf. Discussed return of fertiltiy, and explain irreg d/t bsf, thyroid etc.        Objective   Physical Exam:    Tsh 0.09 in Dec, spoke to pt last Thrs on phone, instructed to skip few days of synthroid to allow tsh level to increase and will decrease dose at Monday's sheryl.     /71   Pulse 77   Temp 98.1 °F (36.7 °C)   Wt 71.7 kg (158 lb)   LMP 12/15/2024 (Exact Date)   SpO2 98%   BMI 27.99 kg/m²      Neck: No lymphadenlopathy, thyrold is irreg on right side feels slightly enlarged. Will send for u/s.      Rx: Synthroid 112mcg qd, 3ref    Assessment/Plan:  1. Hypothyroidism, unspecified type  -     US THYROID; Future  -     TSH; Future  -     T3, Free; Future  -     T4, Free; Future  -     T3; Future  2. Dysphagia, unspecified type  -     US THYROID; Future  -     TSH; Future  -     T3, Free; Future  -     T4, Free; Future  -     T3; Future       No follow-ups on file.   Plan:     Rx  Labs  Thyroid u/s  Rto 5wk for fili and annual  Discussed ref to endocrinologist after results are back.      An electronic signature was used to authenticate this note.    --Prema Lopez, DOUG - THOMAS

## 2025-01-13 NOTE — PROGRESS NOTES
Here today to review labs and to discuss getting a thyroid ultrasound  Patient has no new complaints  Discharge instructions have been discussed with the patient. Patient advised to call our office with any questions or concerns.   Voiced understanding.

## 2025-01-19 ENCOUNTER — PHARMACY VISIT (OUTPATIENT)
Dept: OBGYN | Age: 45
End: 2025-01-19

## 2025-01-19 DIAGNOSIS — R13.13 PHARYNGEAL DYSPHAGIA: Primary | ICD-10-CM

## 2025-01-19 DIAGNOSIS — E03.9 ACQUIRED HYPOTHYROIDISM: ICD-10-CM

## 2025-01-19 RX ORDER — LEVOTHYROXINE SODIUM 112 MCG
112 TABLET ORAL DAILY
Qty: 30 TABLET | Refills: 3 | Status: SHIPPED | OUTPATIENT
Start: 2025-01-19

## 2025-01-24 ENCOUNTER — HOSPITAL ENCOUNTER (OUTPATIENT)
Dept: ULTRASOUND IMAGING | Age: 45
Discharge: HOME | End: 2025-01-26
Payer: COMMERCIAL

## 2025-01-24 DIAGNOSIS — R13.10 DYSPHAGIA, UNSPECIFIED TYPE: ICD-10-CM

## 2025-01-24 DIAGNOSIS — E03.9 HYPOTHYROIDISM, UNSPECIFIED TYPE: ICD-10-CM

## 2025-01-24 PROCEDURE — 76536 US EXAM OF HEAD AND NECK: CPT

## 2025-01-29 ENCOUNTER — TELEPHONE (OUTPATIENT)
Dept: OBGYN | Age: 45
End: 2025-01-29

## 2025-01-29 NOTE — TELEPHONE ENCOUNTER
Patient called in to the office. States that they cannot fill her synthroid because the insurance will not cover it except in generic form. Call made to pharmacy and verbal order given to dispense generic equivalent.

## 2025-02-19 ENCOUNTER — HOSPITAL ENCOUNTER (OUTPATIENT)
Age: 45
Discharge: HOME | End: 2025-02-19
Payer: COMMERCIAL

## 2025-02-19 DIAGNOSIS — R13.10 DYSPHAGIA, UNSPECIFIED TYPE: ICD-10-CM

## 2025-02-19 DIAGNOSIS — E03.9 HYPOTHYROIDISM, UNSPECIFIED TYPE: ICD-10-CM

## 2025-02-19 LAB
T3 SERPL-MCNC: 86 NG/DL (ref 80–200)
T3FREE SERPL-MCNC: 2.38 PG/ML (ref 2–4.4)
T4 FREE SERPL-MCNC: 1 NG/DL (ref 0.9–1.7)
TSH SERPL DL<=0.05 MIU/L-ACNC: 3.6 UIU/ML (ref 0.27–4.2)

## 2025-02-19 PROCEDURE — 84439 ASSAY OF FREE THYROXINE: CPT

## 2025-02-19 PROCEDURE — 36415 COLL VENOUS BLD VENIPUNCTURE: CPT

## 2025-02-19 PROCEDURE — 84481 FREE ASSAY (FT-3): CPT

## 2025-02-19 PROCEDURE — 84443 ASSAY THYROID STIM HORMONE: CPT

## 2025-02-24 ENCOUNTER — OFFICE VISIT (OUTPATIENT)
Dept: OBGYN | Age: 45
End: 2025-02-24
Payer: COMMERCIAL

## 2025-02-24 VITALS
OXYGEN SATURATION: 97 % | WEIGHT: 158 LBS | DIASTOLIC BLOOD PRESSURE: 76 MMHG | SYSTOLIC BLOOD PRESSURE: 120 MMHG | HEART RATE: 94 BPM | BODY MASS INDEX: 27.99 KG/M2 | TEMPERATURE: 98.1 F

## 2025-02-24 DIAGNOSIS — N39.3 STRESS INCONTINENCE: ICD-10-CM

## 2025-02-24 DIAGNOSIS — E03.9 ACQUIRED HYPOTHYROIDISM: ICD-10-CM

## 2025-02-24 DIAGNOSIS — N89.8 VAGINAL ODOR: ICD-10-CM

## 2025-02-24 DIAGNOSIS — E55.9 VITAMIN D DEFICIENCY: ICD-10-CM

## 2025-02-24 DIAGNOSIS — E78.2 HYPERLIPIDEMIA, MIXED: ICD-10-CM

## 2025-02-24 DIAGNOSIS — K64.8 OTHER HEMORRHOIDS: ICD-10-CM

## 2025-02-24 DIAGNOSIS — N89.8 VAGINAL DISCHARGE: ICD-10-CM

## 2025-02-24 DIAGNOSIS — Z86.32 HISTORY OF GESTATIONAL DIABETES: ICD-10-CM

## 2025-02-24 DIAGNOSIS — Z01.419 WELL WOMAN EXAM WITH ROUTINE GYNECOLOGICAL EXAM: Primary | ICD-10-CM

## 2025-02-24 DIAGNOSIS — R53.83 FATIGUE, UNSPECIFIED TYPE: ICD-10-CM

## 2025-02-24 DIAGNOSIS — R35.0 FREQUENCY OF URINATION: ICD-10-CM

## 2025-02-24 PROCEDURE — 99213 OFFICE O/P EST LOW 20 MIN: CPT | Performed by: ADVANCED PRACTICE MIDWIFE

## 2025-02-24 RX ORDER — LEVOTHYROXINE SODIUM 125 UG/1
125 TABLET ORAL DAILY
Qty: 30 TABLET | Refills: 2 | Status: SHIPPED | OUTPATIENT
Start: 2025-02-24

## 2025-02-24 NOTE — PROGRESS NOTES
Subjective:      Jillian Bolanos is a 44 y.o. whtie female, , here for GYN exam.      Current Complaints: Routine Gyn Exam  Patient here for routine exam.  Current Complaints: urinary incontinencee w/sneeze/cough - stress inc.. And c/o still having diff swallowing like food gets stuck when she eats.   Pt still breastfeeding 5mo old son.   Personal Health Questionnaire Reviewed: no     Gynecologic History  Patient's last menstrual period was 2025 (exact date).  Contraception: none  Last Pap: 2/15/2024 wnl  Results: normal  Last Mammogram: 3/19/21 last bilateral normal neg bernardo, small right breast cyst in  8mm in May then 4mm in 2023  Results: normal   Pregnancies and breastfeeding have prevented repeating annual bernardo's; must be 6months off breastfeeding to do screening bernardo.    Obstetric History  : 6  Para: 3  AB: 3       Personal Health Questionnaire Reviewed: no.    Gynecologic History  Patient's last menstrual period was 2025 (exact date).  Contraception: none  Exercise: No  OB History          6    Para   3    Term   3            AB   3    Living   3         SAB   3    IAB        Ectopic        Molar        Multiple   0    Live Births   3                  Review of Systems  Constitutional: negative  Eyes: negative  Ears, nose, mouth, throat, and face: negative  Respiratory: negative  Cardiovascular: negative  Gastrointestinal: positive for hemorrhoids since birth still having issues  Genitourinary:positive for urinary incontinence, see above  Integument/breast: negative Lacctational  Hematologic/lymphatic: negative  Musculoskeletal:negative  Neurological: negative  Behavioral/Psych: negative  Endocrine: positive for hypothyroidism  Allergic/Immunologic: negative   Vaginal odor, no discharge, hx ureaplasma.  Pt would like  a culture. She begins to smell the odor 3 hr after a shower.   Objective:       /76   Pulse 94   Temp 98.1 °F (36.7 °C)   Wt 71.7 kg

## 2025-02-24 NOTE — PROGRESS NOTES
Here today for her annual exam  Patient has no complaints today  Discharge instructions have been discussed with the patient. Patient advised to call our office with any questions or concerns.   Voiced understanding.  Pap smear and health tracks obtained, labeled and sent to lab

## 2025-02-25 DIAGNOSIS — N89.8 VAGINAL DISCHARGE: ICD-10-CM

## 2025-02-25 DIAGNOSIS — N89.8 VAGINAL ODOR: ICD-10-CM

## 2025-02-25 DIAGNOSIS — R35.0 FREQUENCY OF URINATION: ICD-10-CM

## 2025-02-28 LAB
GYNECOLOGY CYTOLOGY REPORT: NORMAL
HPV SAMPLE: NORMAL
HPV SOURCE: NORMAL
HPV, GENOTYPE 16: NOT DETECTED
HPV, GENOTYPE 18: NOT DETECTED
HPV, HIGH RISK OTHER: NOT DETECTED
HPV, INTERPRETATION: NORMAL

## 2025-03-03 ENCOUNTER — HOSPITAL ENCOUNTER (OUTPATIENT)
Age: 45
Discharge: HOME | End: 2025-03-03
Payer: COMMERCIAL

## 2025-03-03 DIAGNOSIS — Z01.419 WELL WOMAN EXAM WITH ROUTINE GYNECOLOGICAL EXAM: ICD-10-CM

## 2025-03-03 DIAGNOSIS — R53.83 FATIGUE, UNSPECIFIED TYPE: ICD-10-CM

## 2025-03-03 DIAGNOSIS — E03.9 ACQUIRED HYPOTHYROIDISM: ICD-10-CM

## 2025-03-03 DIAGNOSIS — Z86.32 HISTORY OF GESTATIONAL DIABETES: ICD-10-CM

## 2025-03-03 DIAGNOSIS — E55.9 VITAMIN D DEFICIENCY: ICD-10-CM

## 2025-03-03 DIAGNOSIS — E78.2 HYPERLIPIDEMIA, MIXED: ICD-10-CM

## 2025-03-03 LAB
25(OH)D3 SERPL-MCNC: 25.1 NG/ML (ref 30–100)
ALBUMIN SERPL-MCNC: 4.3 G/DL (ref 3.5–5.2)
ALP SERPL-CCNC: 97 U/L (ref 35–104)
ALT SERPL-CCNC: 29 U/L (ref 0–32)
ANION GAP SERPL CALCULATED.3IONS-SCNC: 10 MMOL/L (ref 7–16)
AST SERPL-CCNC: 20 U/L (ref 0–31)
BASOPHILS # BLD: 0.05 K/UL (ref 0–0.2)
BASOPHILS NFR BLD: 1 % (ref 0–2)
BILIRUB SERPL-MCNC: 0.5 MG/DL (ref 0–1.2)
BUN SERPL-MCNC: 17 MG/DL (ref 6–20)
CALCIUM SERPL-MCNC: 9.2 MG/DL (ref 8.6–10.2)
CHLORIDE SERPL-SCNC: 101 MMOL/L (ref 98–107)
CHOLEST SERPL-MCNC: 217 MG/DL
CO2 SERPL-SCNC: 25 MMOL/L (ref 22–29)
CREAT SERPL-MCNC: 0.9 MG/DL (ref 0.5–1)
CRP SERPL HS-MCNC: 1.1 MG/L (ref 0–3)
EOSINOPHIL # BLD: 0.28 K/UL (ref 0.05–0.5)
EOSINOPHILS RELATIVE PERCENT: 5 % (ref 0–6)
ERYTHROCYTE [DISTWIDTH] IN BLOOD BY AUTOMATED COUNT: 13.1 % (ref 11.5–15)
GFR, ESTIMATED: 77 ML/MIN/1.73M2
GLUCOSE SERPL-MCNC: 86 MG/DL (ref 74–99)
HBA1C MFR BLD: 5 % (ref 4–5.6)
HCT VFR BLD AUTO: 45.2 % (ref 34–48)
HDLC SERPL-MCNC: 49 MG/DL
HGB BLD-MCNC: 14.6 G/DL (ref 11.5–15.5)
IMM GRANULOCYTES # BLD AUTO: 0.04 K/UL (ref 0–0.58)
IMM GRANULOCYTES NFR BLD: 1 % (ref 0–5)
LDLC SERPL CALC-MCNC: 147 MG/DL
LYMPHOCYTES NFR BLD: 2.03 K/UL (ref 1.5–4)
LYMPHOCYTES RELATIVE PERCENT: 34 % (ref 20–42)
MCH RBC QN AUTO: 29.3 PG (ref 26–35)
MCHC RBC AUTO-ENTMCNC: 32.3 G/DL (ref 32–34.5)
MCV RBC AUTO: 90.6 FL (ref 80–99.9)
MONOCYTES NFR BLD: 0.55 K/UL (ref 0.1–0.95)
MONOCYTES NFR BLD: 9 % (ref 2–12)
NEUTROPHILS NFR BLD: 51 % (ref 43–80)
NEUTS SEG NFR BLD: 3.02 K/UL (ref 1.8–7.3)
PLATELET # BLD AUTO: 238 K/UL (ref 130–450)
PMV BLD AUTO: 9.9 FL (ref 7–12)
POTASSIUM SERPL-SCNC: 4.2 MMOL/L (ref 3.5–5)
PROT SERPL-MCNC: 7.7 G/DL (ref 6.4–8.3)
RBC # BLD AUTO: 4.99 M/UL (ref 3.5–5.5)
SODIUM SERPL-SCNC: 136 MMOL/L (ref 132–146)
TRIGL SERPL-MCNC: 104 MG/DL
VLDLC SERPL CALC-MCNC: 21 MG/DL
WBC OTHER # BLD: 6 K/UL (ref 4.5–11.5)

## 2025-03-03 PROCEDURE — 80061 LIPID PANEL: CPT

## 2025-03-03 PROCEDURE — 82306 VITAMIN D 25 HYDROXY: CPT

## 2025-03-03 PROCEDURE — 36415 COLL VENOUS BLD VENIPUNCTURE: CPT

## 2025-03-03 PROCEDURE — 86141 C-REACTIVE PROTEIN HS: CPT

## 2025-03-03 PROCEDURE — 80053 COMPREHEN METABOLIC PANEL: CPT

## 2025-03-03 PROCEDURE — 83036 HEMOGLOBIN GLYCOSYLATED A1C: CPT

## 2025-03-03 PROCEDURE — 85025 COMPLETE CBC W/AUTO DIFF WBC: CPT

## 2025-03-10 NOTE — PROGRESS NOTES
Sherry entry: I called pt on 1/19/15 to review her results: Tsh 0.09 done on 12/18/24, I adjusted her synthroid from 125mcg daily down to 112mcg daily. Will repeat in 5-6 wk.  Rx sent.

## 2025-03-21 ENCOUNTER — HOSPITAL ENCOUNTER (OUTPATIENT)
Age: 45
Discharge: HOME | End: 2025-03-21
Payer: COMMERCIAL

## 2025-03-21 DIAGNOSIS — E03.9 ACQUIRED HYPOTHYROIDISM: ICD-10-CM

## 2025-03-21 LAB — TSH SERPL DL<=0.05 MIU/L-ACNC: 0.14 UIU/ML (ref 0.27–4.2)

## 2025-03-21 PROCEDURE — 84443 ASSAY THYROID STIM HORMONE: CPT

## 2025-03-21 PROCEDURE — 36415 COLL VENOUS BLD VENIPUNCTURE: CPT

## 2025-03-24 ENCOUNTER — OFFICE VISIT (OUTPATIENT)
Dept: OBGYN | Age: 45
End: 2025-03-24
Payer: COMMERCIAL

## 2025-03-24 VITALS
HEIGHT: 63 IN | DIASTOLIC BLOOD PRESSURE: 81 MMHG | RESPIRATION RATE: 16 BRPM | OXYGEN SATURATION: 96 % | SYSTOLIC BLOOD PRESSURE: 114 MMHG | BODY MASS INDEX: 28.12 KG/M2 | TEMPERATURE: 97.6 F | HEART RATE: 85 BPM | WEIGHT: 158.7 LBS

## 2025-03-24 DIAGNOSIS — E78.2 HYPERLIPIDEMIA, MIXED: ICD-10-CM

## 2025-03-24 DIAGNOSIS — R73.01 ELEVATED FASTING BLOOD SUGAR: ICD-10-CM

## 2025-03-24 DIAGNOSIS — Z86.32 HISTORY OF GESTATIONAL DIABETES: ICD-10-CM

## 2025-03-24 DIAGNOSIS — Z83.3 FAMILY HISTORY OF DIABETES MELLITUS: ICD-10-CM

## 2025-03-24 DIAGNOSIS — E55.9 VITAMIN D DEFICIENCY: ICD-10-CM

## 2025-03-24 DIAGNOSIS — E03.9 ACQUIRED HYPOTHYROIDISM: Primary | ICD-10-CM

## 2025-03-24 DIAGNOSIS — R79.82 ELEVATED C-REACTIVE PROTEIN (CRP): ICD-10-CM

## 2025-03-24 PROCEDURE — G8427 DOCREV CUR MEDS BY ELIG CLIN: HCPCS | Performed by: ADVANCED PRACTICE MIDWIFE

## 2025-03-24 PROCEDURE — 1036F TOBACCO NON-USER: CPT | Performed by: ADVANCED PRACTICE MIDWIFE

## 2025-03-24 PROCEDURE — G8419 CALC BMI OUT NRM PARAM NOF/U: HCPCS | Performed by: ADVANCED PRACTICE MIDWIFE

## 2025-03-24 PROCEDURE — 99213 OFFICE O/P EST LOW 20 MIN: CPT | Performed by: ADVANCED PRACTICE MIDWIFE

## 2025-03-24 PROCEDURE — 99212 OFFICE O/P EST SF 10 MIN: CPT | Performed by: ADVANCED PRACTICE MIDWIFE

## 2025-03-24 RX ORDER — LEVOTHYROXINE SODIUM 125 UG/1
125 TABLET ORAL DAILY
Qty: 30 TABLET | Refills: 5 | Status: SHIPPED | OUTPATIENT
Start: 2025-03-24

## 2025-03-24 NOTE — PROGRESS NOTES
Pt presents for 4 week recheck   Pharmacy, medications, allergies and history reviewed with pt   Cholesterol is really high and would like to know how to bring that down

## 2025-03-24 NOTE — PROGRESS NOTES
Jillian Bolanos (:  1980) is a 44 y.o. female,Established patient, here for evaluation of the following chief complaint(s):  Other (recheck)    Subjective   HPI: Here for f/u from annual. No c/o today. Needs thyroid Rx.     ROS:        Objective   Physical Exam:    /81 (BP Site: Right Upper Arm, Patient Position: Sitting, BP Cuff Size: Small Adult)   Pulse 85   Temp 97.6 °F (36.4 °C) (Temporal)   Resp 16   Ht 1.6 m (5' 3\")   Wt 72 kg (158 lb 11.2 oz)   LMP 2025 (Exact Date)   SpO2 96%   BMI 28.11 kg/m²     Tsh low at 0.14 currently taking levoxyl 125mcg 7 d /wk. Will con't same dose but only at 6d/wk instead of 7, since we increased dose last vs. Vit D low at 25.1, will send Rx D3  2000IU/day d/t bsf 6mo old son. Chol 217 and Ldls 147, Crp 1.1;  and Hx GDM - rec lowfat/chol diet, high protein/fiber, low carbs/fats. Reg exercise, healthy weight, portions. Stress mgmt. Risks for MI, stroke, bld clots, DM all explained.      Repeat Tsh in 6wk and call for results  F-labs 4-5 months call for results     Assessment/Plan:  Encounter Diagnoses   Name Primary?    Acquired hypothyroidism Yes    Hyperlipidemia, mixed     Elevated C-reactive protein (CRP)     Elevated fasting blood sugar     History of gestational diabetes     Family history of diabetes mellitus     Vitamin D deficiency         No follow-ups on file.    Orders Placed This Encounter    TSH     Standing Status:   Future     Expected Date:   3/24/2025     Expiration Date:   3/24/2026    Comprehensive Metabolic Panel     Standing Status:   Future     Expected Date:   3/24/2025     Expiration Date:   2025    Lipid Panel     Standing Status:   Future     Expected Date:   3/24/2025     Expiration Date:   2025     Is Patient Fasting?/# of Hours:   12 hrs     Has the patient fasted?:   Yes    High sensitivity CRP     Standing Status:   Future     Expected Date:   3/24/2025     Expiration Date:   2025    TSH

## 2025-04-01 ENCOUNTER — RESULTS FOLLOW-UP (OUTPATIENT)
Dept: OBGYN | Age: 45
End: 2025-04-01

## 2025-04-02 NOTE — RESULT ENCOUNTER NOTE
Called and left patient a detailed vm with number to return call to schedule office visit for TSH result management.

## 2025-04-04 ENCOUNTER — TELEPHONE (OUTPATIENT)
Dept: OBGYN | Age: 45
End: 2025-04-04

## 2025-04-04 NOTE — TELEPHONE ENCOUNTER
Patient called in and left a vm in response to me calling and leaving her a vm to make appt for medication management per provider request. States that she already did that and her dose has been adjusted. States she is confused. Please advise.

## 2025-05-19 ENCOUNTER — HOSPITAL ENCOUNTER (OUTPATIENT)
Age: 45
Discharge: HOME | End: 2025-05-19

## 2025-05-19 DIAGNOSIS — E78.2 HYPERLIPIDEMIA, MIXED: ICD-10-CM

## 2025-05-19 DIAGNOSIS — E03.9 ACQUIRED HYPOTHYROIDISM: ICD-10-CM

## 2025-05-19 DIAGNOSIS — R79.82 ELEVATED C-REACTIVE PROTEIN (CRP): ICD-10-CM

## 2025-05-19 DIAGNOSIS — R73.01 ELEVATED FASTING BLOOD SUGAR: ICD-10-CM

## 2025-05-19 DIAGNOSIS — E55.9 VITAMIN D DEFICIENCY: ICD-10-CM

## 2025-05-19 LAB
25(OH)D3 SERPL-MCNC: 28.5 NG/ML (ref 30–100)
ALBUMIN SERPL-MCNC: 4.2 G/DL (ref 3.5–5.2)
ALP SERPL-CCNC: 90 U/L (ref 35–104)
ALT SERPL-CCNC: 22 U/L (ref 0–32)
ANION GAP SERPL CALCULATED.3IONS-SCNC: 9 MMOL/L (ref 7–16)
AST SERPL-CCNC: 17 U/L (ref 0–31)
BILIRUB SERPL-MCNC: 0.4 MG/DL (ref 0–1.2)
BUN SERPL-MCNC: 13 MG/DL (ref 6–20)
CALCIUM SERPL-MCNC: 9.1 MG/DL (ref 8.6–10.2)
CHLORIDE SERPL-SCNC: 106 MMOL/L (ref 98–107)
CO2 SERPL-SCNC: 24 MMOL/L (ref 22–29)
CREAT SERPL-MCNC: 0.8 MG/DL (ref 0.5–1)
CRP SERPL HS-MCNC: 0.5 MG/L (ref 0–3)
GFR, ESTIMATED: >90 ML/MIN/1.73M2
GLUCOSE SERPL-MCNC: 85 MG/DL (ref 74–99)
POTASSIUM SERPL-SCNC: 4.3 MMOL/L (ref 3.5–5)
PROT SERPL-MCNC: 7.2 G/DL (ref 6.4–8.3)
SODIUM SERPL-SCNC: 139 MMOL/L (ref 132–146)
TSH SERPL DL<=0.05 MIU/L-ACNC: 1.32 UIU/ML (ref 0.27–4.2)

## 2025-05-19 PROCEDURE — 86141 C-REACTIVE PROTEIN HS: CPT

## 2025-05-19 PROCEDURE — 84443 ASSAY THYROID STIM HORMONE: CPT

## 2025-05-19 PROCEDURE — 80053 COMPREHEN METABOLIC PANEL: CPT

## 2025-05-19 PROCEDURE — 36415 COLL VENOUS BLD VENIPUNCTURE: CPT

## 2025-05-19 PROCEDURE — 82306 VITAMIN D 25 HYDROXY: CPT

## 2025-05-22 ENCOUNTER — RESULTS FOLLOW-UP (OUTPATIENT)
Dept: OBGYN | Age: 45
End: 2025-05-22

## 2025-05-22 RX ORDER — VITAMIN A ACETATE, .BETA.-CAROTENE, ASCORBIC ACID, CHOLECALCIFEROL, .ALPHA.-TOCOPHEROL ACETATE, DL-, THIAMINE MONONITRATE, RIBOFLAVIN, NIACINAMIDE, PYRIDOXINE HYDROCHLORIDE, FOLIC ACID, CYANOCOBALAMIN, CALCIUM CARBONATE, FERROUS FUMARATE, ZINC OXIDE, CUPRIC OXIDE 9.9; 120; 920; 200; 400; 2; 12; 27; 1; 20; 10; 3; 1.84; 3080; 25 MG/1; MG/1; [IU]/1; MG/1; [IU]/1; MG/1; UG/1; MG/1; MG/1; MG/1; MG/1; MG/1; MG/1; [IU]/1; MG/1
1 TABLET, FILM COATED ORAL DAILY
Qty: 30 TABLET | Refills: 11 | Status: SHIPPED | OUTPATIENT
Start: 2025-05-22

## 2025-07-23 RX ORDER — LEVOTHYROXINE SODIUM 125 UG/1
125 TABLET ORAL DAILY
Qty: 30 TABLET | Refills: 5 | Status: SHIPPED | OUTPATIENT
Start: 2025-07-23

## 2025-08-04 ENCOUNTER — HOSPITAL ENCOUNTER (OUTPATIENT)
Dept: LAB | Age: 45
Discharge: HOME | End: 2025-08-04
Payer: COMMERCIAL

## 2025-08-04 LAB
25(OH)D3 SERPL-MCNC: 25.6 NG/ML (ref 30–100)
ALBUMIN SERPL-MCNC: 3.9 G/DL (ref 3.5–5.2)
ALP SERPL-CCNC: 81 U/L (ref 35–104)
ALT SERPL-CCNC: 24 U/L (ref 0–35)
ANION GAP SERPL CALCULATED.3IONS-SCNC: 9 MMOL/L (ref 7–16)
AST SERPL-CCNC: 19 U/L (ref 0–35)
BILIRUB SERPL-MCNC: 0.3 MG/DL (ref 0–1.2)
BUN SERPL-MCNC: 17 MG/DL (ref 6–20)
CALCIUM SERPL-MCNC: 8.8 MG/DL (ref 8.6–10)
CHLORIDE SERPL-SCNC: 104 MMOL/L (ref 98–107)
CHOLEST SERPL-MCNC: 211 MG/DL
CO2 SERPL-SCNC: 23 MMOL/L (ref 22–29)
CREAT SERPL-MCNC: 0.8 MG/DL (ref 0.5–1)
CRP SERPL HS-MCNC: 0.2 MG/L (ref 0–3)
GFR, ESTIMATED: 87 ML/MIN/1.73M2
GLUCOSE SERPL-MCNC: 94 MG/DL (ref 74–99)
HDLC SERPL-MCNC: 46 MG/DL
LDLC SERPL CALC-MCNC: 149 MG/DL
POTASSIUM SERPL-SCNC: 4.2 MMOL/L (ref 3.5–5.1)
PROT SERPL-MCNC: 6.9 G/DL (ref 6.4–8.3)
SODIUM SERPL-SCNC: 136 MMOL/L (ref 136–145)
TRIGL SERPL-MCNC: 81 MG/DL
VLDLC SERPL CALC-MCNC: 16 MG/DL

## 2025-08-04 PROCEDURE — 82306 VITAMIN D 25 HYDROXY: CPT

## 2025-08-04 PROCEDURE — 80053 COMPREHEN METABOLIC PANEL: CPT

## 2025-08-04 PROCEDURE — 80061 LIPID PANEL: CPT

## 2025-08-04 PROCEDURE — 36415 COLL VENOUS BLD VENIPUNCTURE: CPT

## 2025-08-04 PROCEDURE — 86141 C-REACTIVE PROTEIN HS: CPT

## 2025-08-25 ENCOUNTER — TELEPHONE (OUTPATIENT)
Age: 45
End: 2025-08-25

## 2025-08-25 DIAGNOSIS — E03.9 HYPOTHYROIDISM, UNSPECIFIED TYPE: Primary | ICD-10-CM
